# Patient Record
Sex: FEMALE | Race: WHITE | NOT HISPANIC OR LATINO | Employment: OTHER | URBAN - METROPOLITAN AREA
[De-identification: names, ages, dates, MRNs, and addresses within clinical notes are randomized per-mention and may not be internally consistent; named-entity substitution may affect disease eponyms.]

---

## 2017-03-31 ENCOUNTER — TELEPHONE (OUTPATIENT)
Dept: CARDIOLOGY | Facility: MEDICAL CENTER | Age: 72
End: 2017-03-31

## 2017-03-31 NOTE — TELEPHONE ENCOUNTER
Discussed safe medications for pt to take and which to avoid. Pt was advised to avoid decongestants and anything with pseudoephedrine as it may exacerbate her a-fib.  Also advised pt to s/w her PCP about nasal sprays that may also be helpful. Pt understands and appreciates.

## 2017-03-31 NOTE — TELEPHONE ENCOUNTER
----- Message from Josee Akhtar sent at 3/31/2017  8:59 AM PDT -----  Regarding: patient has question about OTC for cold  KAYLIN/Alfredo      Patient on Xarelto and wants to know about taking OTC decongestant for her cold. She can be reached at [phone].

## 2017-04-25 DIAGNOSIS — I48.0 PAF (PAROXYSMAL ATRIAL FIBRILLATION) (HCC): ICD-10-CM

## 2017-05-02 ENCOUNTER — NON-PROVIDER VISIT (OUTPATIENT)
Dept: CARDIOLOGY | Facility: MEDICAL CENTER | Age: 72
End: 2017-05-02
Payer: MEDICARE

## 2017-05-02 ENCOUNTER — HOSPITAL ENCOUNTER (OUTPATIENT)
Dept: CARDIOLOGY | Facility: MEDICAL CENTER | Age: 72
End: 2017-05-02
Attending: FAMILY MEDICINE | Admitting: FAMILY MEDICINE
Payer: MEDICARE

## 2017-05-02 DIAGNOSIS — I48.0 PAROXYSMAL ATRIAL FIBRILLATION (HCC): ICD-10-CM

## 2017-05-02 DIAGNOSIS — I71.21 ANEURYSM OF AORTIC ROOT (HCC): Chronic | ICD-10-CM

## 2017-05-02 DIAGNOSIS — I49.5 SICK SINUS SYNDROME (HCC): ICD-10-CM

## 2017-05-02 DIAGNOSIS — Z95.0 CARDIAC PACEMAKER IN SITU: ICD-10-CM

## 2017-05-02 LAB
LV EJECT FRACT  99904: 65
LV EJECT FRACT MOD 2C 99903: 64.57
LV EJECT FRACT MOD 4C 99902: 70.85
LV EJECT FRACT MOD BP 99901: 70.09

## 2017-05-02 PROCEDURE — 93306 TTE W/DOPPLER COMPLETE: CPT | Mod: 26 | Performed by: INTERNAL MEDICINE

## 2017-05-02 PROCEDURE — 93306 TTE W/DOPPLER COMPLETE: CPT

## 2017-05-02 PROCEDURE — 93280 PM DEVICE PROGR EVAL DUAL: CPT | Performed by: INTERNAL MEDICINE

## 2017-05-03 ENCOUNTER — TELEPHONE (OUTPATIENT)
Dept: CARDIOLOGY | Facility: MEDICAL CENTER | Age: 72
End: 2017-05-03

## 2017-05-03 NOTE — TELEPHONE ENCOUNTER
----- Message -----      From: Ronni Llanes M.D.      Sent: 5/2/2017   2:55 PM        To: Beatriz Ceja R.N.     Please call with unremarkable study, echocardiogram showing mild ascending aortic aneurysm. No change from last year. We will repeat an echocardiogram in one year.     Thanks.  KAYLIN   --------  Pt notified of Echo results. Pt to FU with Dr. Llanes 5/11

## 2017-05-11 ENCOUNTER — OFFICE VISIT (OUTPATIENT)
Dept: CARDIOLOGY | Facility: MEDICAL CENTER | Age: 72
End: 2017-05-11
Payer: MEDICARE

## 2017-05-11 VITALS
SYSTOLIC BLOOD PRESSURE: 148 MMHG | DIASTOLIC BLOOD PRESSURE: 92 MMHG | HEIGHT: 66 IN | BODY MASS INDEX: 32.14 KG/M2 | WEIGHT: 200 LBS | OXYGEN SATURATION: 93 % | HEART RATE: 60 BPM

## 2017-05-11 DIAGNOSIS — I10 BENIGN ESSENTIAL HYPERTENSION: Chronic | ICD-10-CM

## 2017-05-11 DIAGNOSIS — Z95.0 CARDIAC PACEMAKER IN SITU: ICD-10-CM

## 2017-05-11 DIAGNOSIS — E78.5 DYSLIPIDEMIA: ICD-10-CM

## 2017-05-11 DIAGNOSIS — I49.5 SSS (SICK SINUS SYNDROME) (HCC): Chronic | ICD-10-CM

## 2017-05-11 DIAGNOSIS — I48.0 PAROXYSMAL ATRIAL FIBRILLATION (HCC): Chronic | ICD-10-CM

## 2017-05-11 DIAGNOSIS — I48.92 ATRIAL FLUTTER, UNSPECIFIED TYPE (HCC): Chronic | ICD-10-CM

## 2017-05-11 DIAGNOSIS — I71.21 ANEURYSM OF AORTIC ROOT (HCC): Chronic | ICD-10-CM

## 2017-05-11 DIAGNOSIS — Z79.01 CHRONIC ANTICOAGULATION: ICD-10-CM

## 2017-05-11 PROCEDURE — 3017F COLORECTAL CA SCREEN DOC REV: CPT | Mod: 8P | Performed by: INTERNAL MEDICINE

## 2017-05-11 PROCEDURE — 1036F TOBACCO NON-USER: CPT | Performed by: INTERNAL MEDICINE

## 2017-05-11 PROCEDURE — G8419 CALC BMI OUT NRM PARAM NOF/U: HCPCS | Performed by: INTERNAL MEDICINE

## 2017-05-11 PROCEDURE — 1101F PT FALLS ASSESS-DOCD LE1/YR: CPT | Mod: 8P | Performed by: INTERNAL MEDICINE

## 2017-05-11 PROCEDURE — 4040F PNEUMOC VAC/ADMIN/RCVD: CPT | Performed by: INTERNAL MEDICINE

## 2017-05-11 PROCEDURE — 99214 OFFICE O/P EST MOD 30 MIN: CPT | Performed by: INTERNAL MEDICINE

## 2017-05-11 PROCEDURE — 3014F SCREEN MAMMO DOC REV: CPT | Mod: 8P | Performed by: INTERNAL MEDICINE

## 2017-05-11 PROCEDURE — G8598 ASA/ANTIPLAT THER USED: HCPCS | Performed by: INTERNAL MEDICINE

## 2017-05-11 PROCEDURE — G8432 DEP SCR NOT DOC, RNG: HCPCS | Performed by: INTERNAL MEDICINE

## 2017-05-11 RX ORDER — TRAZODONE HYDROCHLORIDE 50 MG/1
100 TABLET ORAL NIGHTLY
COMMUNITY
End: 2017-09-01

## 2017-05-11 ASSESSMENT — ENCOUNTER SYMPTOMS
ABDOMINAL PAIN: 0
NERVOUS/ANXIOUS: 0
DEPRESSION: 1
DIZZINESS: 0
LOSS OF CONSCIOUSNESS: 0
BLOOD IN STOOL: 0
SHORTNESS OF BREATH: 0
PALPITATIONS: 0
MYALGIAS: 0
WEIGHT LOSS: 0
FLANK PAIN: 0
CHILLS: 0
ORTHOPNEA: 0
FEVER: 0
PND: 0
INSOMNIA: 1
BLURRED VISION: 0

## 2017-05-11 NOTE — MR AVS SNAPSHOT
"        Jayna Connorsepifanio   2017 10:20 AM   Office Visit   MRN: [id number]    Department:  [facility]   Dept Phone:  [phone]    Description:  Female : 1945   Provider:  Ronni Llanes M.D.           Reason for Visit     Follow-Up           Allergies as of 2017     Allergen Noted Reactions    Levaquin 10/08/2014   Hives, Swelling    Macrobid [Nitrofurantoin Macrocrystal] 10/08/2014   Hives, Swelling    Sotalol 2014       \"dizzy\"      You were diagnosed with     Paroxysmal atrial fibrillation (CMS-[facility])   [850283]       Atrial flutter, unspecified type (CMS-[facility])   [1076750]       SSS (sick sinus syndrome) (CMS-[facility])   [901054]       Chronic anticoagulation   [354040]       Cardiac pacemaker in situ   [V45.01.ICD-9-CM]       Benign essential hypertension   [605388]       Dyslipidemia   [430567]       Aneurysm of aortic root (CMS-[facility])   [530969]         Vital Signs     Blood Pressure Pulse Height Weight Body Mass Index Oxygen Saturation    148/92 mmHg 60 1.676 m (5' 5.98\") 90.719 kg (200 lb) 32.30 kg/m2 93%    Smoking Status                   Never Smoker            Basic Information     Date Of Birth Sex Race Ethnicity Preferred Language    1945 Female White Non- English      Your appointments     May 23, 2017  9:30 AM   NM CARDIAC STRESS TEST (30) with [facility]   [facility]    [address]  [place] [id number] [phone]           NPO for 4 hours prior to scan.  No caffeine for 24 hours prior to test (decaf, coffee, cola, tea, chocolate, Excedrin, and Anacin).  No Viagra or other ED meds for 48 hours.  Warn patient of length of test and to bring list of meds.            2017  3:00 PM   PACER CHECK ONLY with PACER [facility]   [facility] (--)    [address]  [place] [address] [phone]            2018  2:15 PM   ECHO with ECHO [facility]   "   ECHOCARDIOLOGY [facility] ([facility])    [address]  [place] [address]   [phone]           No prep              Problem List              ICD-10-CM Priority Class Noted - Resolved    Aneurysm of aortic root (CMS-HCC) (Chronic) I71.9   9/27/2011 - Present    Atrial flutter (CMS-HCC) (Chronic) I48.92 Medium  9/27/2011 - Present    Benign essential hypertension (Chronic) I10 Low  9/27/2011 - Present    Carotid artery stenosis (Chronic) I65.29   9/27/2011 - Present    Long term current use of anticoagulant therapy (Chronic) Z79.01 Medium  8/4/2011 - Present    Dizziness (Chronic) R42 High  9/27/2011 - Present    History of echocardiogram (Chronic) Z92.89   9/27/2011 - Present    History of myocardial perfusion scan (Chronic) Z92.89   9/27/2011 - Present    Presence of permanent cardiac pacemaker (Chronic) Z95.0 Medium  9/27/2011 - Present    Paroxysmal atrial fibrillation (CMS-HCC) (Chronic) I48.0 Medium  9/27/2011 - Present    Pulmonary embolism (CMS-HCC) (Chronic) I26.99   8/4/2011 - Present    SSS (sick sinus syndrome) (CMS-HCC) (Chronic) I49.5 Medium  7/22/2011 - Present    Sleep apnea (Chronic) G47.30   9/27/2011 - Present    Encounter for long-term (current) use of other medications (Chronic) Z79.899   7/22/2011 - Present    TIA (transient ischemic attack) (Chronic) G45.9   9/27/2011 - Present    History of depression (Chronic) Z86.59   9/27/2011 - Present    Rectocele, female N81.6   2/4/2014 - Present    Stress incontinence in female N39.3   2/4/2014 - Present    Rectal pain K62.89   2/7/2014 - Present    Carotid stenosis I65.29   5/9/2014 - Present    Chronic anticoagulation Z79.01   5/11/2017 - Present    Cardiac pacemaker in situ Z95.0   5/11/2017 - Present    Dyslipidemia E78.5   5/11/2017 - Present      Health Maintenance        Date Due Completion Dates    IMM DTaP/Tdap/Td Vaccine (1 - Tdap) 8/23/1962 ---    PAP SMEAR 8/23/1964 ---    MAMMOGRAM 8/23/1983 ---    COLONOSCOPY 8/23/1993 ---    BONE  DENSITY 8/23/2008 ---    IMM PNEUMOCOCCAL 65+ (ADULT) LOW/MEDIUM RISK SERIES (2 of 2 - PCV13) 10/1/2010 10/1/2009            Current Immunizations     Influenza TIV (IM) 10/1/2013    Pneumococcal polysaccharide vaccine (PPSV-23) 10/1/2009    SHINGLES VACCINE 10/1/2013      Below and/or attached are the medications your provider expects you to take. Review all of your home medications and newly ordered medications with your provider and/or pharmacist. Follow medication instructions as directed by your provider and/or pharmacist. Please keep your medication list with you and share with your provider. Update the information when medications are discontinued, doses are changed, or new medications (including over-the-counter products) are added; and carry medication information at all times in the event of emergency situations     Allergies:  LEVAQUIN - Hives,Swelling     MACROBID - Hives,Swelling     SOTALOL - (reactions not documented)               Medications  Valid as of: May 11, 2017 - 11:18 AM    Generic Name Brand Name Tablet Size Instructions for use    Ascorbic Acid (Cap) Vitamin C 500 MG Take 500 mg by mouth every day.        Biotin (Cap) Biotin 5000 MCG Take  by mouth.        Calcium Carbonate-Vit D-Min   Take 1 Tab by mouth every day.        Cyanocobalamin   Take  by mouth.        DiphenhydrAMINE HCl (Tab) BENADRYL 25 MG Take 25 mg by mouth at bedtime as needed.        Estrogens, Conjugated (Cream) PREMARIN 0.625 MG/GM Insert 0.5 g in vagina every 3 days.        Ezetimibe (Tab) ZETIA 10 MG TAKE 0.5 TABLET BY MOUTH EVERY DAY        Glucos-MSM-C-Wg-Qenjhu-Ymlmxc (Tab) GLUCOSAMINE MSM COMPLEX  Take 2 Tabs by mouth every day. 1200 mg        Melatonin (Tab) Melatonin 5 MG Take  by mouth.        Methenamine Hippurate (Tab) HIPPREX 1 GM Take 1 g by mouth every day.        Metoprolol Tartrate (Tab) LOPRESSOR 25 MG TAKE 1 TABLET BY MOUTH TWICE DAILY        Misc Natural Products   Take  by mouth every day.         Multiple Vitamins-Minerals   Take 1 Tab by mouth every day.        Omega-3 Fatty Acids (Cap) Omega-3 Fatty Acids 1200 MG Take 1,000 mg by mouth every day.        Probiotic Product   Take  by mouth.        Rivaroxaban (Tab) XARELTO 20 MG Take 1 Tab by mouth with dinner.        TraZODone HCl (Tab) DESYREL 50 MG Take 50 mg by mouth every evening.        .                 Medicines prescribed today were sent to:     [id number] - [place], [place] - [address] AT [facility]    [address] [place] [address]    Phone: [phone] Fax: [phone]    Open 24 Hours?: No    [id number] - [place], [place] - [address] AT [facility] [address]    [address] [place] [address]    Phone: [phone] Fax: [phone]    Open 24 Hours?: No      Medication refill instructions:       If your prescription bottle indicates you have medication refills left, it is not necessary to call your provider’s office. Please contact your pharmacy and they will refill your medication.    If your prescription bottle indicates you do not have any refills left, you may request refills at any time through one of the following ways: The online [employer] system (except Urgent Care), by calling your provider’s office, or by asking your pharmacy to contact your provider’s office with a refill request. Medication refills are processed only during regular business hours and may not be available until the next business day. Your provider may request additional information or to have a follow-up visit with you prior to refilling your medication.   *Please Note: Medication refills are assigned a new Rx number when refilled electronically. Your pharmacy may indicate that no refills were authorized even though a new prescription for the same medication is available at the pharmacy. Please request the medicine by name with the pharmacy before contacting your provider for a refill.        Your  To Do List     Future Labs/Procedures Complete By Expires    ECHOCARDIOGRAM COMP W/O CONT  As directed 5/12/2018    NM-CARDIAC STRESS TEST  As directed 11/11/2017    Scheduling Instructions:    Atrial fibrillation.         [employer] Access Code: Activation code not generated  Current [employer] Status: Active

## 2017-05-11 NOTE — PROGRESS NOTES
"Subjective:   Jayna Lewis is a 71 y.o. female who presents for annual follow up of ascending aortic aneurysm and study result review.    HPI    Since the patient's last visit on 05/10/16, she has been doing well clinically from cardiac standpoint. She denies chest pain, shortness of breath, palpitations, nausea/vomiting or diaphoresis. She has been suffering with insomnia and depression and has been started on medication.    Review of Systems   Constitutional: Negative for fever, chills, weight loss and malaise/fatigue.        Weight gain.   HENT: Negative for congestion.    Eyes: Negative for blurred vision.   Respiratory: Negative for shortness of breath.    Cardiovascular: Negative for chest pain, palpitations, orthopnea, leg swelling and PND.   Gastrointestinal: Negative for abdominal pain, blood in stool and melena.   Genitourinary: Negative for dysuria, urgency, frequency, hematuria and flank pain.   Musculoskeletal: Negative for myalgias and joint pain.   Skin: Negative for rash.   Neurological: Negative for dizziness and loss of consciousness.   Psychiatric/Behavioral: Positive for depression. The patient has insomnia. The patient is not nervous/anxious.         Objective:     /92 mmHg  Pulse 60  Ht 1.676 m (5' 5.98\")  Wt 90.719 kg (200 lb)  BMI 32.30 kg/m2  SpO2 93%    Physical Exam   Constitutional: She is oriented to person, place, and time. She appears well-developed and well-nourished.   HENT:   Head: Normocephalic and atraumatic.   Eyes: Conjunctivae are normal. Pupils are equal, round, and reactive to light.   Neck: Normal range of motion. Neck supple.   Cardiovascular: Normal rate and regular rhythm.    Pulmonary/Chest: Effort normal and breath sounds normal.   Abdominal: Soft. Bowel sounds are normal. She exhibits no distension and no mass. There is no tenderness. There is no rebound and no guarding.   Musculoskeletal: Normal range of motion. She exhibits no edema.   Neurological: " She is alert and oriented to person, place, and time.   Skin: Skin is warm and dry.   Psychiatric: She has a normal mood and affect.     Medications reviewed.    Current Outpatient Prescriptions   Medication   • Cyanocobalamin (VITAMIN B 12 PO)   • Probiotic Product (PROBIOTIC DAILY PO)   • trazodone (DESYREL) 50 MG Tab   • rivaroxaban (XARELTO) 20 MG Tab tablet   • metoprolol (LOPRESSOR) 25 MG Tab   • ZETIA 10 MG Tab   • Biotin 5000 MCG Cap   • methenamine hip (HIPPREX) 1 GM TABS   • Ascorbic Acid (VITAMIN C) 500 MG CAPS   • Omega-3 Fatty Acids (FISH OIL) 1200 MG CAPS   • Calcium Carbonate-Vit D-Min (CALCIUM 1200 PO)   • conjugated estrogen (PREMARIN) 0.625 MG/GM CREA   • Multiple Vitamin (MULTIVITAMIN PO)   • Glucos-MSM-C-Zs-Idhswl-Yakzeq (GLUCOSAMINE MSM COMPLEX) TABS   • Melatonin 5 MG Tab   • Misc Natural Products (DEEP SLEEP PO)   • diphenhydrAMINE (BENADRYL) 25 MG TABS     No current facility-administered medications for this visit.     CARDIAC STUDIES/PROCEDURES:    CAROTID ULTRASOUND (05/14/14)  Very mild plaque seen in carotid artery bilaterally.  Normal vertebral flow.    CAROTID ULTRASOUND (07/14/09)  Carotid ultrasound showing mild stenosis.    CT OF CHEST (04/15/15)  1. Some fusiform dilatation of the ascending thoracic aorta is noted with maximum axial dimensions of 4.7 x 4.2 cm. There is no evidence of saccular aneurysm and there is no evidence of dissection.  2. No significant atherosclerotic plaque either calcified or noncalcified is visible in the aorta.  3. Main branch vessels of the aorta in the chest and abdomen appear patent.  4. Cholelithiasis.  5. Small area of arterioportal shunting appears to be present laterally in the right lobe of the liver.  6. Linear opacifications in each lung base are likely due to discoid atelectasis or scarring.    ECHOCARDIOGRAM CONCLUSIONS (05/02/17)  Prior echocardiogram 4/25/2016. Compared to the report of the study   done - there has been no significant  change.   Normal left ventricular systolic function.  Left ventricular ejection fraction is visually estimated to be 65%.  Grade I diastolic dysfunction.  Pacer/ICD wire seen in right ventricle.  Mild mitral regurgitation.  Mild tricuspid regurgitation.  Estimated right ventricular systolic pressure is 25 mmHg.  Ascending aorta is dilated with a diameter of 4.4 cm.    ECHOCARDIOGRAM CONCLUSIONS (04/25/16)  Compared to the report of the study done 09/14/15- there has been no   significant change.   Normal left ventricular systolic function.  Left ventricular ejection fraction is visually estimated to be 65%.  Grade I diastolic dysfunction.  Pacer/ICD wire seen in right ventricle.  Mild mitral regurgitation.  Mild tricuspid regurgitation.  Right ventricular systolic pressure is estimated to be 25 mmHg.  Mild pulmonic insufficiency.  Ascending aorta is dilated with a diameter of 4.3 cm.    ECHOCARDIOGRAM CONCLUSIONS (09/14/15)  Compared to prior study of 01/07/2015; no significant changes were noted.  Normal left ventricular systolic function.  Left ventricular ejection fraction is visually estimated to be 65%.  Grade I diastolic dysfunction.   Pacer/ICD wire seen in right ventricle.  Mild tricuspid regurgitation.  Right ventricular systolic pressure is estimated to be 30 mmHg.  Mild to moderate pulmonic insufficiency.  Ascending aorta dilatation, 4.3 cm.    ECHOCARDIOGRAM CONCLUSIONS (01/07/15)  Normal left ventricular systolic function.  Mild concentric left ventricular hypertrophy.  Grade II diastolic dysfunction - pseudonormal mitral inflow is observed.  Pacer wire seen in right ventricle.  Catheter/pacemaker wire present in the right atrial cavity.  Mildly dilated left atrium.  Mitral annular calcification.  Mild mitral regurgitation.  Aortic sclerosis without stenosis.  Mild tricuspid regurgitation.  Trace pulmonic insufficiency.  Aortic root, 3.5 cm. Dilated ascending aorta, 4.5 cm.    ECHOCARDIOGRAM CONCLUSIONS  (06/25/13)  Normal left ventricular systolic function.   Left ventricular ejection fraction is 65% to 70%.   Mild mitral regurgitation.  Moderate pulmonic insufficiency.  Ascending aorta measuring 3.5 cm.    ECHOCARDIOGRAM CONCLUSIONS (07/27/11)  Echocardiogram showing ejection fraction of 70% with mild tricuspid regurgitation and mild aortic root dilation.    EKG performed on (02/20/14) EKG shows sinus tachycardia with diffuse ST segment depressions.  EKG performed on (02/08/14) EKG shows atrial fibrillation.    Laboratory results of (03/28/17) were reviewed. Cholesterol profile of 179/103/63/95 noted.  Laboratory results of (05/09/16) Cholesterol profile of 150/97/60/71 noted.  Laboratory results of (10/30/15) Cholesterol profile of 195/92/62/115 noted.  Laboratory results of (01/07/15) Cholesterol profile of 182/62/66/104 noted.  Laboratory results of (01/03/12) Protein C and S levels were low.    MPI CONCLUSION (10/06/06)  Unremarkable MPI.     Assessment:     Patient Active Problem List   Diagnoses Date Noted   • Atrial flutter [427.32] Paroxysmal atrial fibrillation [427.31K] 09/27/2011     Priority: Medium   • SSS (sick sinus syndrome) [427.81AE] 09/27/2011     Priority: Medium   • Presence of permanent cardiac pacemaker [V45.01AR] 09/27/2011     Priority: Medium   • Encounter for long-term (current) use of anticoagulants [V58.61] 08/04/2011     Priority: Medium   • Hypertension [401.1B] 09/27/2011     Priority: Low   • Hyperlipidemia [272.4AY] 09/27/2011     Priority: Low   • Aneurysm of aortic root [441.9AZ] 09/27/2011   • Carotid artery stenosis [433.10A] 09/27/2011   • TIA (transient ischemic attack) [435.9E] 09/27/2011   • Pulmonary embolism [415.19AD] 08/04/2011     Plan:     1. Atrial fibrillation/flutter/sick sinus syndrome with pacemaker placement on anticoagulation therapy (Xarelto) and intolerance sotalol. She is clinically doing well. We will repeat a myocardial perfusion imaging study.  2.  Hypertension: Blood pressure is high today, however, usually well controlled.  3. Hyperlipidemia with intolerance to statin therapy: Stable on [place] 3 FA and Zetia therapy. We will repeat labs including fasting lipid profile in one year.  4. Aortic root dilation: Clinically stable on medical therapy. We will repeat an echocardiogram in one year.  5. Carotid artery stenosis: Mild disease which is stable on medical therapy.   6. History of trans-ischemic attack: She remains clinically stable without any new neurological symptoms.  7. History of pulmonary embolism: She is clinically doing well without recurrence or chest pain or shortness of breath.  8. Health maintenance: Vaginal hematoma, status post rectocele repair and mid-urethral sling procedure managed by Dr. Eden: Recovered.  9. Additional information: She is under stress due lost of contact with all of her children.    We will follow up the patient in one year with echocardiogram and myocardial perfusion imaging study.    CC Vu Retana

## 2017-05-11 NOTE — Clinical Note
"     [facility]   [address], [address]  [place] [address]  Phone: [phone]  Fax: [phone]              Jayna Lewis  1945    Encounter Date: 5/11/2017    Ronni Llanes M.D.          PROGRESS NOTE:  Subjective:   Jayna Lewis is a 71 y.o. female who presents for annual follow up of ascending aortic aneurysm and study result review.    HPI    Since the patient's last visit on 05/10/16, she has been doing well clinically from cardiac standpoint. She denies chest pain, shortness of breath, palpitations, nausea/vomiting or diaphoresis. She has been suffering with insomnia and depression and has been started on medication.    Review of Systems   Constitutional: Negative for fever, chills, weight loss and malaise/fatigue.        Weight gain.   HENT: Negative for congestion.    Eyes: Negative for blurred vision.   Respiratory: Negative for shortness of breath.    Cardiovascular: Negative for chest pain, palpitations, orthopnea, leg swelling and PND.   Gastrointestinal: Negative for abdominal pain, blood in stool and melena.   Genitourinary: Negative for dysuria, urgency, frequency, hematuria and flank pain.   Musculoskeletal: Negative for myalgias and joint pain.   Skin: Negative for rash.   Neurological: Negative for dizziness and loss of consciousness.   Psychiatric/Behavioral: Positive for depression. The patient has insomnia. The patient is not nervous/anxious.         Objective:     /92 mmHg  Pulse 60  Ht 1.676 m (5' 5.98\")  Wt 90.719 kg (200 lb)  BMI 32.30 kg/m2  SpO2 93%    Physical Exam   Constitutional: She is oriented to person, place, and time. She appears well-developed and well-nourished.   HENT:   Head: Normocephalic and atraumatic.   Eyes: Conjunctivae are normal. Pupils are equal, round, and reactive to light.   Neck: Normal range of motion. Neck supple.   Cardiovascular: Normal rate and regular rhythm.    Pulmonary/Chest: Effort " normal and breath sounds normal.   Abdominal: Soft. Bowel sounds are normal. She exhibits no distension and no mass. There is no tenderness. There is no rebound and no guarding.   Musculoskeletal: Normal range of motion. She exhibits no edema.   Neurological: She is alert and oriented to person, place, and time.   Skin: Skin is warm and dry.   Psychiatric: She has a normal mood and affect.     Medications reviewed.    Current Outpatient Prescriptions   Medication   • Cyanocobalamin (VITAMIN B 12 PO)   • Probiotic Product (PROBIOTIC DAILY PO)   • trazodone (DESYREL) 50 MG Tab   • rivaroxaban (XARELTO) 20 MG Tab tablet   • metoprolol (LOPRESSOR) 25 MG Tab   • ZETIA 10 MG Tab   • Biotin 5000 MCG Cap   • methenamine hip (HIPPREX) 1 GM TABS   • Ascorbic Acid (VITAMIN C) 500 MG CAPS   • Omega-3 Fatty Acids (FISH OIL) 1200 MG CAPS   • Calcium Carbonate-Vit D-Min (CALCIUM 1200 PO)   • conjugated estrogen (PREMARIN) 0.625 MG/GM CREA   • Multiple Vitamin (MULTIVITAMIN PO)   • Glucos-MSM-C-Xq-Jciaoy-Ccinpz (GLUCOSAMINE MSM COMPLEX) TABS   • Melatonin 5 MG Tab   • Misc Natural Products (DEEP SLEEP PO)   • diphenhydrAMINE (BENADRYL) 25 MG TABS     No current facility-administered medications for this visit.     CARDIAC STUDIES/PROCEDURES:    CAROTID ULTRASOUND (05/14/14)  Very mild plaque seen in carotid artery bilaterally.  Normal vertebral flow.    CAROTID ULTRASOUND (07/14/09)  Carotid ultrasound showing mild stenosis.    CT OF CHEST (04/15/15)  1. Some fusiform dilatation of the ascending thoracic aorta is noted with maximum axial dimensions of 4.7 x 4.2 cm. There is no evidence of saccular aneurysm and there is no evidence of dissection.  2. No significant atherosclerotic plaque either calcified or noncalcified is visible in the aorta.  3. Main branch vessels of the aorta in the chest and abdomen appear patent.  4. Cholelithiasis.  5. Small area of arterioportal shunting appears to be present laterally in the right lobe of  the liver.  6. Linear opacifications in each lung base are likely due to discoid atelectasis or scarring.    ECHOCARDIOGRAM CONCLUSIONS (05/02/17)  Prior echocardiogram 4/25/2016. Compared to the report of the study   done - there has been no significant change.   Normal left ventricular systolic function.  Left ventricular ejection fraction is visually estimated to be 65%.  Grade I diastolic dysfunction.  Pacer/ICD wire seen in right ventricle.  Mild mitral regurgitation.  Mild tricuspid regurgitation.  Estimated right ventricular systolic pressure is 25 mmHg.  Ascending aorta is dilated with a diameter of 4.4 cm.    ECHOCARDIOGRAM CONCLUSIONS (04/25/16)  Compared to the report of the study done 09/14/15- there has been no   significant change.   Normal left ventricular systolic function.  Left ventricular ejection fraction is visually estimated to be 65%.  Grade I diastolic dysfunction.  Pacer/ICD wire seen in right ventricle.  Mild mitral regurgitation.  Mild tricuspid regurgitation.  Right ventricular systolic pressure is estimated to be 25 mmHg.  Mild pulmonic insufficiency.  Ascending aorta is dilated with a diameter of 4.3 cm.    ECHOCARDIOGRAM CONCLUSIONS (09/14/15)  Compared to prior study of 01/07/2015; no significant changes were noted.  Normal left ventricular systolic function.  Left ventricular ejection fraction is visually estimated to be 65%.  Grade I diastolic dysfunction.   Pacer/ICD wire seen in right ventricle.  Mild tricuspid regurgitation.  Right ventricular systolic pressure is estimated to be 30 mmHg.  Mild to moderate pulmonic insufficiency.  Ascending aorta dilatation, 4.3 cm.    ECHOCARDIOGRAM CONCLUSIONS (01/07/15)  Normal left ventricular systolic function.  Mild concentric left ventricular hypertrophy.  Grade II diastolic dysfunction - pseudonormal mitral inflow is observed.  Pacer wire seen in right ventricle.  Catheter/pacemaker wire present in the right atrial cavity.  Mildly dilated  left atrium.  Mitral annular calcification.  Mild mitral regurgitation.  Aortic sclerosis without stenosis.  Mild tricuspid regurgitation.  Trace pulmonic insufficiency.  Aortic root, 3.5 cm. Dilated ascending aorta, 4.5 cm.    ECHOCARDIOGRAM CONCLUSIONS (06/25/13)  Normal left ventricular systolic function.   Left ventricular ejection fraction is 65% to 70%.   Mild mitral regurgitation.  Moderate pulmonic insufficiency.  Ascending aorta measuring 3.5 cm.    ECHOCARDIOGRAM CONCLUSIONS (07/27/11)  Echocardiogram showing ejection fraction of 70% with mild tricuspid regurgitation and mild aortic root dilation.    EKG performed on (02/20/14) EKG shows sinus tachycardia with diffuse ST segment depressions.  EKG performed on (02/08/14) EKG shows atrial fibrillation.    Laboratory results of (03/28/17) were reviewed. Cholesterol profile of 179/103/63/95 noted.  Laboratory results of (05/09/16) Cholesterol profile of 150/97/60/71 noted.  Laboratory results of (10/30/15) Cholesterol profile of 195/92/62/115 noted.  Laboratory results of (01/07/15) Cholesterol profile of 182/62/66/104 noted.  Laboratory results of (01/03/12) Protein C and S levels were low.    MPI CONCLUSION (10/06/06)  Unremarkable MPI.     Assessment:     Patient Active Problem List   Diagnoses Date Noted   • Atrial flutter [427.32] Paroxysmal atrial fibrillation [427.31K] 09/27/2011     Priority: Medium   • SSS (sick sinus syndrome) [427.81AE] 09/27/2011     Priority: Medium   • Presence of permanent cardiac pacemaker [V45.01AR] 09/27/2011     Priority: Medium   • Encounter for long-term (current) use of anticoagulants [V58.61] 08/04/2011     Priority: Medium   • Hypertension [401.1B] 09/27/2011     Priority: Low   • Hyperlipidemia [272.4AY] 09/27/2011     Priority: Low   • Aneurysm of aortic root [441.9AZ] 09/27/2011   • Carotid artery stenosis [433.10A] 09/27/2011   • TIA (transient ischemic attack) [435.9E] 09/27/2011   • Pulmonary embolism [415.19AD]  08/04/2011     Plan:     1. Atrial fibrillation/flutter/sick sinus syndrome with pacemaker placement on anticoagulation therapy (Xarelto) and intolerance sotalol. She is clinically doing well. We will repeat a myocardial perfusion imaging study.  2. Hypertension: Blood pressure is high today, however, usually well controlled.  3. Hyperlipidemia with intolerance to statin therapy: Stable on [place] 3 FA and Zetia therapy. We will repeat labs including fasting lipid profile in one year.  4. Aortic root dilation: Clinically stable on medical therapy. We will repeat an echocardiogram in one year.  5. Carotid artery stenosis: Mild disease which is stable on medical therapy.   6. History of trans-ischemic attack: She remains clinically stable without any new neurological symptoms.  7. History of pulmonary embolism: She is clinically doing well without recurrence or chest pain or shortness of breath.  8. Health maintenance: Vaginal hematoma, status post rectocele repair and mid-urethral sling procedure managed by Dr. Eden: Recovered.  9. Additional information: She is under stress due lost of contact with all of her children.    We will follow up the patient in one year with echocardiogram and myocardial perfusion imaging study.    CC Vu Retana

## 2017-05-23 ENCOUNTER — HOSPITAL ENCOUNTER (OUTPATIENT)
Dept: RADIOLOGY | Facility: MEDICAL CENTER | Age: 72
End: 2017-05-23
Attending: INTERNAL MEDICINE
Payer: MEDICARE

## 2017-05-23 DIAGNOSIS — I48.0 PAROXYSMAL ATRIAL FIBRILLATION (HCC): Chronic | ICD-10-CM

## 2017-05-23 DIAGNOSIS — I48.92 ATRIAL FLUTTER, UNSPECIFIED TYPE (HCC): Chronic | ICD-10-CM

## 2017-05-23 PROCEDURE — A9502 TC99M TETROFOSMIN: HCPCS

## 2017-05-23 PROCEDURE — 700111 HCHG RX REV CODE 636 W/ 250 OVERRIDE (IP)

## 2017-05-23 RX ORDER — REGADENOSON 0.08 MG/ML
INJECTION, SOLUTION INTRAVENOUS
Status: COMPLETED
Start: 2017-05-23 | End: 2017-05-23

## 2017-05-23 RX ADMIN — REGADENOSON 0.4 MG: 0.08 INJECTION, SOLUTION INTRAVENOUS at 10:42

## 2017-05-23 NOTE — PROGRESS NOTES
Nursing care plan includes knowledge deficit, potential for discomfort, potential for compromised cardiac output. POC includes teaching, comfort measures and reassurance, and access to code cart, cardiology stand by and availability of rapid response team. Pt verbalizes good understanding of benefits and risks of pharmacological cardiac stress test. Informed consent obtained. Lexiscan given, pt developed the following symptoms palpitations and slight SOB. VS stable, major symptoms resolved. To waiting room, caffeinated fluids and/or snacks given, awaiting second scan. Nursing goals met.

## 2017-05-25 ENCOUNTER — HOSPITAL ENCOUNTER (EMERGENCY)
Facility: MEDICAL CENTER | Age: 72
End: 2017-05-25
Attending: EMERGENCY MEDICINE
Payer: MEDICARE

## 2017-05-25 VITALS
HEART RATE: 56 BPM | RESPIRATION RATE: 16 BRPM | OXYGEN SATURATION: 98 % | SYSTOLIC BLOOD PRESSURE: 128 MMHG | WEIGHT: 198.19 LBS | BODY MASS INDEX: 31.85 KG/M2 | HEIGHT: 66 IN | DIASTOLIC BLOOD PRESSURE: 81 MMHG | TEMPERATURE: 98.2 F

## 2017-05-25 DIAGNOSIS — H00.012 HORDEOLUM EXTERNUM OF RIGHT LOWER EYELID: ICD-10-CM

## 2017-05-25 PROCEDURE — 99283 EMERGENCY DEPT VISIT LOW MDM: CPT

## 2017-05-25 RX ORDER — BACITRACIN 500 [USP'U]/G
0.5 OINTMENT OPHTHALMIC 3 TIMES DAILY
Qty: 1 TUBE | Refills: 0 | Status: SHIPPED | OUTPATIENT
Start: 2017-05-25 | End: 2017-09-01

## 2017-05-25 ASSESSMENT — PAIN SCALES - GENERAL: PAINLEVEL_OUTOF10: 2

## 2017-05-25 NOTE — ED NOTES
Co right eye pain with a little drainage in the am for 3 days. She feels that her eyes are sunken, small lower lid redness present. Had a super bug recently and she states since then she get odd infections is odd places.

## 2017-05-25 NOTE — ED AVS SNAPSHOT
Home Care Instructions                                                                                                                Jayna Lewis   MRN: [id number]    Department:  [facility], Emergency Dept   Date of Visit:  5/25/2017            [facility], Emergency Dept    [address] [place] [address]    Phone:  [phone]      You were seen by     Dre Allred M.D.      Your Diagnosis Was     Hordeolum externum of right lower eyelid     H00.012       Follow-up Information     1. Follow up with Your Eye Doctor. Schedule an appointment as soon as possible for a visit in 1 week.    Why:  For re-check      Medication Information     Review all of your home medications and newly ordered medications with your primary doctor and/or pharmacist as soon as possible. Follow medication instructions as directed by your doctor and/or pharmacist.     Please keep your complete medication list with you and share with your physician. Update the information when medications are discontinued, doses are changed, or new medications (including over-the-counter products) are added; and carry medication information at all times in the event of emergency situations.               Medication List      START taking these medications        Instructions    Morning Afternoon Evening Bedtime    bacitracin 500 UNIT/GM Oint        Place 0.5 Inches in right eye 3 times a day.   Dose:  0.5 Inch                          ASK your doctor about these medications        Instructions    Morning Afternoon Evening Bedtime    Biotin 5000 MCG Caps        Take  by mouth.                        CALCIUM 1200 PO        Take 1 Tab by mouth every day.   Dose:  1 Tab                        conjugated estrogen 0.625 MG/GM Crea   Commonly known as:  PREMARIN        Insert 0.5 g in vagina See Admin Instructions. Monday PM Friday AM   Dose:  0.5 g                        GLUCOSAMINE MSM COMPLEX  Tabs        Take 2 Tabs by mouth every day. 1200 mg   Dose:  2 Tab                        methenamine hip 1 GM Tabs   Commonly known as:  HIPPREX        Take 1 g by mouth every day.   Dose:  1 g                        metoprolol 25 MG Tabs   Commonly known as:  LOPRESSOR        TAKE 1 TABLET BY MOUTH TWICE DAILY                        MULTIVITAMIN PO        Take 1 Tab by mouth every day.   Dose:  1 Tab                        Omega-3 Fatty Acids 1200 MG Caps        Take 1,000 mg by mouth every day.   Dose:  1000 mg                        PROBIOTIC DAILY PO        Take  by mouth.                        rivaroxaban 20 MG Tabs tablet   Commonly known as:  XARELTO        Take 1 Tab by mouth with dinner.   Dose:  20 mg                        trazodone 50 MG Tabs   Commonly known as:  DESYREL        Take 100 mg by mouth every evening.   Dose:  100 mg                        VITAMIN B 12 PO        Take  by mouth.                        Vitamin C 500 MG Caps        Take 500 mg by mouth every day.   Dose:  500 mg                        ZETIA 10 MG Tabs   Generic drug:  ezetimibe        TAKE 0.5 TABLET BY MOUTH EVERY DAY                             Where to Get Your Medications      You can get these medications from any pharmacy     Bring a paper prescription for each of these medications    - bacitracin 500 UNIT/GM Oint              Discharge Instructions       Stye  A stye is a bump on your eyelid caused by a bacterial infection. A stye can form inside the eyelid (internal stye) or outside the eyelid (external stye). An internal stye may be caused by an infected oil-producing gland inside your eyelid. An external stye may be caused by an infection at the base of your eyelash (hair follicle).  Styes are very common. Anyone can get them at any age. They usually occur in just one eye, but you may have more than one in either eye.   CAUSES   The infection is almost always caused by bacteria called Staphylococcus aureus. This is  a common type of bacteria that lives on your skin.  RISK FACTORS  You may be at higher risk for a stye if you have had one before. You may also be at higher risk if you have:  · Diabetes.  · Long-term illness.  · Long-term eye redness.  · A skin condition called seborrhea.  · High fat levels in your blood (lipids).  SIGNS AND SYMPTOMS   Eyelid pain is the most common symptom of a stye. Internal styes are more painful than external styes. Other signs and symptoms may include:  · Painful swelling of your eyelid.  · A scratchy feeling in your eye.  · Tearing and redness of your eye.  · Pus draining from the stye.  DIAGNOSIS   Your health care provider may be able to diagnose a stye just by examining your eye. The health care provider may also check to make sure:  · You do not have a fever or other signs of a more serious infection.  · The infection has not spread to other parts of your eye or areas around your eye.  TREATMENT   Most styes will clear up in a few days without treatment. In some cases, you may need to use antibiotic drops or ointment to prevent infection. Your health care provider may have to drain the stye surgically if your stye is:  · Large.  · Causing a lot of pain.  · Interfering with your vision.  This can be done using a thin blade or a needle.   HOME CARE INSTRUCTIONS   · Take medicines only as directed by your health care provider.  · Apply a clean, warm compress to your eye for 10 minutes, 4 times a day.  · Do not wear contact lenses or eye makeup until your stye has healed.  · Do not try to pop or drain the stye.  SEEK MEDICAL CARE IF:  · You have chills or a fever.  · Your stye does not go away after several days.  · Your stye affects your vision.  · Your eyeball becomes swollen, red, or painful.  MAKE SURE YOU:  · Understand these instructions.  · Will watch your condition.  · Will get help right away if you are not doing well or get worse.     This information is not intended to replace  advice given to you by your health care provider. Make sure you discuss any questions you have with your health care provider.     Document Released: 09/27/2006 Document Revised: 01/08/2016 Document Reviewed: 04/03/2015  Elsevier Interactive Patient Education ©2016 Elsevier Inc.            Patient Information     Patient Information    Following emergency treatment: all patient requiring follow-up care must return either to a private physician or a clinic if your condition worsens before you are able to obtain further medical attention, please return to the emergency room.     Billing Information    At [facility], we work to make the billing process streamlined for our patients.  Our Representatives are here to answer any questions you may have regarding your hospital bill.  If you have insurance coverage and have supplied your insurance information to us, we will submit a claim to your insurer on your behalf.  Should you have any questions regarding your bill, we can be reached online or by phone as follows:  Online: You are able pay your bills online or live chat with our representatives about any billing questions you may have. We are here to help Monday - Friday from 8:00am to 7:30pm and 9:00am - 12:00pm on Saturdays.  Please visit [email or web address]  for more information.   Phone:  [phone] or [phone]    Please note that your emergency physician, surgeon, pathologist, radiologist, anesthesiologist, and other specialists are not employed by [facility] and will therefore bill separately for their services.  Please contact them directly for any questions concerning their bills at the numbers below:     Emergency Physician Services:  [phone]  [facility]:  [phone]  [facility]:  [phone]  [facility]:  [phone]    1. Your final bill may vary from the amount quoted upon discharge if all procedures are not  complete at that time, or if your doctor has additional procedures of which we are not aware. You will receive an additional bill if you return to the Emergency Department at [facility] for suture removal regardless of the facility of which the sutures were placed.     2. Please arrange for settlement of this account at the emergency registration.    3. All self-pay accounts are due in full at the time of treatment.  If you are unable to meet this obligation then payment is expected within 4-5 days.     4. If you have had radiology studies (CT, X-ray, Ultrasound, MRI), you have received a preliminary result during your emergency department visit. Please contact the radiology department [phone] to receive a copy of your final result. Please discuss the Final result with your primary physician or with the follow up physician provided.     Crisis Hotline:  [place] Crisis Hotline:  4-731-DUOAXSH or 1-969.299.8540  [place] Crisis Hotline:    1-161.932.9551 or 707-452-4456         ED Discharge Follow Up Questions    1. In order to provide you with very good care, we would like to follow up with a phone call in the next few days.  May we have your permission to contact you?     YES /  NO    2. What is the best phone number to call you? (       )_____-__________    3. What is the best time to call you?      Morning  /  Afternoon  /  Evening                   Patient Signature:  ____________________________________________________________    Date:  ____________________________________________________________      Your appointments     Nov 07, 2017  3:00 PM   PACER CHECK ONLY with PACER CHECK[facility]   [facility] (--)    [address], [address]  [place] [address]   [phone]            Apr 24, 2018  2:15 PM   ECHO with ECHO [facility]   ECHOCARDIOLOGY [facility] ([facility])    [address]  [place] [address]   [phone]           No prep

## 2017-05-25 NOTE — ED AVS SNAPSHOT
[employer] Access Code: Activation code not generated  Current [employer] Status: Active    [employer]  A secure, online tool to manage your health information     [employer]’s [employer]® is a secure, online tool that connects you to your personalized health information from the privacy of your home -- day or night - making it very easy for you to manage your healthcare. Once the activation process is completed, you can even access your medical information using the [employer] ivana, which is available for free in the Apple Ivana store or Google Play store.     [employer] provides the following levels of access (as shown below):   My Chart Features   [facility] Primary Care Doctor [facility]  Specialists [facility]  Urgent  Care Non-[facility]  Primary Care  Doctor   Email your healthcare team securely and privately 24/7 X X X X   Manage appointments: schedule your next appointment; view details of past/upcoming appointments X      Request prescription refills. X      View recent personal medical records, including lab and immunizations X X X X   View health record, including health history, allergies, medications X X X X   Read reports about your outpatient visits, procedures, consult and ER notes X X X X   See your discharge summary, which is a recap of your hospital and/or ER visit that includes your diagnosis, lab results, and care plan. X X       How to register for [employer]  1. Go to  [email or web address].  2. Click on the Sign Up Now box, which takes you to the New Member Sign Up page. You will need to provide the following information:  a. Enter your [employer] Access Code exactly as it appears at the top of this page. (You will not need to use this code after you’ve completed the sign-up process. If you do not sign up before the expiration date, you must request a new code.)   b. Enter your date of birth.   c. Enter your home email address.   d. Click Submit, and follow the next screen’s instructions.  3. Create a [employer] ID. This will  be your [employer] login ID and cannot be changed, so think of one that is secure and easy to remember.  4. Create a [employer] password. You can change your password at any time.  5. Enter your Password Reset Question and Answer. This can be used at a later time if you forget your password.   6. Enter your e-mail address. This allows you to receive e-mail notifications when new information is available in [employer].  7. Click Sign Up. You can now view your health information.    For assistance activating your [employer] account, call [phone]

## 2017-05-25 NOTE — ED AVS SNAPSHOT
5/25/2017    Jayna Lewis  [address]  [place] [address]    Dear Jayna:    [facility] wants to ensure your discharge home is safe and you or your loved ones have had all of your questions answered regarding your care after you leave the hospital.    Below is a list of resources and contact information should you have any questions regarding your hospital stay, follow-up instructions, or active medical symptoms.    Questions or Concerns Regarding… Contact   Medical Questions Related to Your Discharge  (7 days a week, 8am-5pm) Contact a Nurse Care Coordinator   [phone]   Medical Questions Not Related to Your Discharge  (24 hours a day / 7 days a week)  Contact the Nurse Health Line   [phone]    Medications or Discharge Instructions Refer to your discharge packet   or contact your [facility] Primary Care Provider   [phone]   Follow-up Appointment(s) Schedule your appointment via [employer]   or contact Scheduling [phone]   Billing Review your statement via [employer]  or contact Billing [phone]   Medical Records Review your records via [employer]   or contact Medical Records [phone]     You may receive a telephone call within two days of discharge. This call is to make certain you understand your discharge instructions and have the opportunity to have any questions answered. You can also easily access your medical information, test results and upcoming appointments via the [employer] free online health management tool. You can learn more and sign up at [email or web address]. For assistance setting up your [employer] account, please call [phone].    Once again, we want to ensure your discharge home is safe and that you have a clear understanding of any next steps in your care. If you have any questions or concerns, please do not hesitate to contact us, we are here for you. Thank you for choosing [facility] for your healthcare needs.    Sincerely,    Your [facility] Healthcare Team

## 2017-05-25 NOTE — ED PROVIDER NOTES
"ED Provider Note    CHIEF COMPLAINT  Chief Complaint   Patient presents with   • Conjunctivitis     possible        HPI  Jayna Lewis is a 71 y.o. female who presents to the ED with complaints of pain in the right lower lid of her right eye. Patient states that 2 days ago, started him some increasing pain to the lateral edge of the lower lid. The patient's concern because she's had \"super bug infections\" in the years past. The patient was asked for evaluation. Denies any visual changes, changes, denies any discharge.    REVIEW OF SYSTEMS  See HPI for further details. All other systems are negative.     PAST MEDICAL HISTORY  Past Medical History   Diagnosis Date   • Aneurysm of aortic root (CMS-HCC) 9/27/2011   • Benign essential hypertension 9/27/2011   • Carotid artery stenosis 9/27/2011   • Long term (current) use of anticoagulants 8/4/2011   • Dizziness 9/27/2011   • History of echocardiogram 9/27/2011   • HLD (hyperlipidemia) 9/27/2011   • History of myocardial perfusion scan 9/27/2011   • Presence of permanent cardiac pacemaker 9/27/2011   • Pulmonary embolism (CMS-HCC) 8/4/2011   • Encounter for long-term (current) use of other medications 7/22/2011   • TIA (transient ischemic attack) 9/27/2011   • History of depression 9/27/2011   • Unspecified urinary incontinence    • Other specified symptom associated with female genital organs    • Pacemaker    • Other specified disorder of intestines    • Urinary bladder disorder    • Cancer (CMS-HCC)      skin   • Stroke (CMS-HCC) 2009     TIA   • CATARACT      removed   • Pulmonary embolism (CMS-HCC) 2011   • Unspecified hemorrhagic conditions      takes coumadin   • Atrial flutter (CMS-HCC) 9/27/2011   • Paroxysmal atrial fibrillation (CMS-HCC) 9/27/2011   • SSS (sick sinus syndrome) (CMS-HCC) 7/22/2011   • Sleep apnea 9/27/2011     no CPAP       FAMILY HISTORY  No family history on file.  Patient's family history has been discussed and is been found to be " noncontributory to his present illness  SOCIAL HISTORY  Social History     Social History   • Marital Status:      Spouse Name: N/A   • Number of Children: N/A   • Years of Education: N/A     Social History Main Topics   • Smoking status: Never Smoker    • Smokeless tobacco: Never Used   • Alcohol Use: Yes      Comment: rare   • Drug Use: No   • Sexual Activity: Not on file     Other Topics Concern   • Not on file     Social History Narrative      Vu Vila M.D.        SURGICAL HISTORY  Past Surgical History   Procedure Laterality Date   • Other       BLADDER SURGERY.   • Other cardiac surgery  2010     CATHETER ABLATION - ATRIAL FLUTTER.   • Hysterectomy, total abdominal     • Pacemaker insertion  2009   • Tonsillectomy     • Other  2011     pulmonary embolism   • Rectocele repair  2/4/2014     Performed by Tanvir Eden M.D. at [facility]   • Bladder sling female  2/4/2014     Performed by Tanvir Eden M.D. at [facility]   • Cystoscopy  2/4/2014     Performed by Tanvir Eden M.D. at [facility]       CURRENT MEDICATIONS  Home Medications     Reviewed by Erik Ramesh (Pharmacy Tech) on 05/25/17 at 0944  Med List Status: Complete    Medication Last Dose Status    Ascorbic Acid (VITAMIN C) 500 MG CAPS 5/24/2017 Active    Biotin 5000 MCG Cap 5/24/2017 Active    Calcium Carbonate-Vit D-Min (CALCIUM 1200 PO) 5/24/2017 Active    conjugated estrogen (PREMARIN) 0.625 MG/GM CREA 5/22/2017 Active    Cyanocobalamin (VITAMIN B 12 PO) 5/24/2017 Active    Glucos-MSM-C-Mo-Phnygo-Sggeje (GLUCOSAMINE MSM COMPLEX) TABS 5/24/2017 Active    methenamine hip (HIPPREX) 1 GM TABS 5/25/2017 Active    metoprolol (LOPRESSOR) 25 MG Tab 5/25/2017 Active    Multiple Vitamin (MULTIVITAMIN PO) 5/24/2017 Active    Omega-3 Fatty Acids (FISH OIL) 1200 MG CAPS 5/24/2017 Active    Probiotic Product (PROBIOTIC DAILY PO) 5/24/2017 Active    rivaroxaban  "(XARELTO) 20 MG Tab tablet 5/24/2017 Active    trazodone (DESYREL) 50 MG Tab 5/24/2017 Active    ZETIA 10 MG Tab 5/25/2017 Active                ALLERGIES  Allergies   Allergen Reactions   • Levaquin Hives and Swelling   • Macrobid [Nitrofurantoin Macrocrystal] Hives and Swelling   • Sotalol      \"dizzy\"       PHYSICAL EXAM  VITAL SIGNS: /81 mmHg  Pulse 56  Temp(Src) 36.8 °C (98.2 °F)  Resp 16  Ht 1.676 m (5' 5.98\")  Wt 89.9 kg (198 lb 3.1 oz)  BMI 32.00 kg/m2  SpO2 98%   Pulse Ox interpretation. Nonhypoxic    Constitutional: Well developed, Well nourished, No acute distress, Non-toxic appearance.   HENT: Normocephalic, Atraumatic, Bilateral external ears normal, bilateral tympanic membranes normal, Oropharynx moist, No oral exudates, Nose normal.   Eyes:  Lids/Lashes right lower lid. There is a sty on the lateral edge. There is some slight erythema to the area. Conjunctiva slightly injected   Sclera normal   Cornea, normal     Neck: Supple, no cervical lymphadenopathy, no meningeal signs..   Lymphatic: No lymphadenopathy noted.     Skin: Warm, Dry, No erythema,   Extremities: Intact distal pulses, no clubbing, no cyanosis, no edema, nontender.      RADIOLOGY/PROCEDURES  None    COURSE & MEDICAL DECISION MAKING  Pertinent Labs & Imaging studies reviewed. (See chart for details)  She does have a sty to the right lower lid. Recommend warm compresses as well as all bacitracin ointment. The patient is to follow-up with her eye doctor. She sees Dr. Knowles in one week for outpatient follow-up. Return as needed.  FINAL IMPRESSION  1. Hordeolum externum of right lower eyelid       The patient will return for new or worsening symptoms and is stable at the time of discharge.    The patient is referred to a primary physician for blood pressure management, diabetic screening, and for all other preventative health concerns.    DISPOSITION:  Patient will be discharged home in stable condition.    FOLLOW UP:  Your Eye " Doctor    Schedule an appointment as soon as possible for a visit in 1 week  For re-check      OUTPATIENT MEDICATIONS:  New Prescriptions    BACITRACIN 500 UNIT/GM OINTMENT    Place 0.5 Inches in right eye 3 times a day.               Electronically signed by: Dre Allred, 5/25/2017 9:49 AM

## 2017-06-08 ENCOUNTER — TELEPHONE (OUTPATIENT)
Dept: CARDIOLOGY | Facility: MEDICAL CENTER | Age: 72
End: 2017-06-08

## 2017-07-05 ENCOUNTER — TELEPHONE (OUTPATIENT)
Dept: INTERNAL MEDICINE | Facility: MEDICAL CENTER | Age: 72
End: 2017-07-05

## 2017-07-05 NOTE — TELEPHONE ENCOUNTER
MAs-   I see that pt has appt to see me in Nov.   Pt told me it was for consult only.   Please call her and tell her I was thinking about her case.   I think going to the [facility] would be a better idea because she is seeing me for consult.   At [facility], a comprehensive yasir assessment would be done.  She would see a geriatrician (me or Sabrina Muller), a pharmacist and a .    [facility] would come up with recs and she would f/u with PCP which I know she wants to do.   She would be seen sooner than Nov.   Please give her info to [facility].   PCP can do referral to [facility].   Please let me know what she says.

## 2017-08-02 ENCOUNTER — TELEPHONE (OUTPATIENT)
Dept: CARDIOLOGY | Facility: MEDICAL CENTER | Age: 72
End: 2017-08-02

## 2017-08-02 NOTE — TELEPHONE ENCOUNTER
----- Message from Josee Akhtar sent at 8/2/2017  2:14 PM PDT -----  Regarding: patient in A-fib right now  KAYLIN/Alfredo      Patient says she is in A-fib right now. Her blood pressure is 123/100, pulse 91. She would like a call back at [phone].

## 2017-08-02 NOTE — TELEPHONE ENCOUNTER
"Pt states she has had more episodes of feeling like she is in a-fib recently and had an episode today but now is feeling better. She describes feeling lightheaded, dizzy and feeling palpitations or \"shaking\" in her head and chest. She says she has had a sinus infection which may be contributing to this. She then went on describing very vague symptoms unrelated to the a-fib episodes including a \"bulging feeling\" or like her \"chest is dropping\" in the lower left side of her chest. She states this may be that she has gained weight lately and its her \"fat\".  She was concerned mostly about the symptoms and wonders if she should \"be worried about them\".     To KAYLIN: please advise  "

## 2017-08-04 NOTE — TELEPHONE ENCOUNTER
S/w pt, she would like to FU with [facility] to discuss symptoms. Appointment scheduled for 8/16 @1520. Pt does not have a way to transmit Pacemaker reading from home, states that she would be interested in getting one so she can send in rhythms for provider to review. Educated her that I will send a message to Dominique CAMACHO to see if she can assist.     Pacemaker check scheduled as well for 8/16 @ 1415. Pt just wanting to make sure everything is working correctly.     To Dominique, is there any way you can assist? Thank you!    Dominique Molina, Med Ass't  [place] Doherty RSANDER        Caller: Unspecified (2 days ago, 3:42 PM)       Spoke with patient-- she has an older device and is not compatible with a remote monitor.  Verbalized understanding.     Thanks   Dominique

## 2017-08-16 ENCOUNTER — OFFICE VISIT (OUTPATIENT)
Dept: CARDIOLOGY | Facility: MEDICAL CENTER | Age: 72
End: 2017-08-16
Payer: MEDICARE

## 2017-08-16 ENCOUNTER — NON-PROVIDER VISIT (OUTPATIENT)
Dept: CARDIOLOGY | Facility: MEDICAL CENTER | Age: 72
End: 2017-08-16
Payer: MEDICARE

## 2017-08-16 VITALS
HEART RATE: 72 BPM | BODY MASS INDEX: 30.86 KG/M2 | OXYGEN SATURATION: 95 % | DIASTOLIC BLOOD PRESSURE: 90 MMHG | SYSTOLIC BLOOD PRESSURE: 140 MMHG | HEIGHT: 66 IN | WEIGHT: 192 LBS

## 2017-08-16 DIAGNOSIS — R42 DIZZINESS: Chronic | ICD-10-CM

## 2017-08-16 DIAGNOSIS — I48.92 ATRIAL FLUTTER, UNSPECIFIED TYPE (HCC): Chronic | ICD-10-CM

## 2017-08-16 DIAGNOSIS — I26.99 OTHER ACUTE PULMONARY EMBOLISM WITHOUT ACUTE COR PULMONALE (HCC): Chronic | ICD-10-CM

## 2017-08-16 DIAGNOSIS — G45.9 TRANSIENT CEREBRAL ISCHEMIA, UNSPECIFIED TYPE: Chronic | ICD-10-CM

## 2017-08-16 DIAGNOSIS — Z95.0 PRESENCE OF PERMANENT CARDIAC PACEMAKER: Chronic | ICD-10-CM

## 2017-08-16 DIAGNOSIS — G47.30 SLEEP APNEA, UNSPECIFIED TYPE: Chronic | ICD-10-CM

## 2017-08-16 DIAGNOSIS — I71.21 ANEURYSM OF AORTIC ROOT (HCC): Chronic | ICD-10-CM

## 2017-08-16 DIAGNOSIS — I10 BENIGN ESSENTIAL HYPERTENSION: Chronic | ICD-10-CM

## 2017-08-16 DIAGNOSIS — I49.5 SICK SINUS SYNDROME (HCC): ICD-10-CM

## 2017-08-16 DIAGNOSIS — I65.23 BILATERAL CAROTID ARTERY STENOSIS: Chronic | ICD-10-CM

## 2017-08-16 DIAGNOSIS — E78.5 DYSLIPIDEMIA: ICD-10-CM

## 2017-08-16 DIAGNOSIS — Z79.01 LONG TERM CURRENT USE OF ANTICOAGULANT THERAPY: Chronic | ICD-10-CM

## 2017-08-16 DIAGNOSIS — Z95.0 CARDIAC PACEMAKER IN SITU: ICD-10-CM

## 2017-08-16 PROCEDURE — 99214 OFFICE O/P EST MOD 30 MIN: CPT | Performed by: NURSE PRACTITIONER

## 2017-08-16 PROCEDURE — 93280 PM DEVICE PROGR EVAL DUAL: CPT | Performed by: INTERNAL MEDICINE

## 2017-08-16 ASSESSMENT — ENCOUNTER SYMPTOMS
PALPITATIONS: 1
PND: 0
CLAUDICATION: 0
MYALGIAS: 0
DIZZINESS: 1
ORTHOPNEA: 0
ABDOMINAL PAIN: 0
SHORTNESS OF BREATH: 1
FEVER: 0
COUGH: 0

## 2017-08-16 NOTE — PROGRESS NOTES
Subjective:   Jayna Lewis is a 71 y.o. female who presents today for follow up concerning palpitations associated with shortness of breath and dizziness.    She is a patient of Dr. Llanes in our office. Hx of SSS with PPM placement, HLD, TIA, PE, HTN, and afib/flutter with previous ablation in '10 on Xarelto.    She is overall doing well now but had a three day period of afib and didn't feel good during this time. Symptoms of dizziness, shortness of breath, and a heavy feeling in her chest.    Her pacer check confirmed her 3 day episode of afib, she is currently on no anti-arrhythmic therapy. She is tolerating her Xarelto with no bleeding.    She has had no episodes of chest pain, orthopnea, or peripheral edema.    Past Medical History   Diagnosis Date   • Aneurysm of aortic root (CMS-[facility]) 9/27/2011   • Benign essential hypertension 9/27/2011   • Carotid artery stenosis 9/27/2011   • Long term (current) use of anticoagulants 8/4/2011   • Dizziness 9/27/2011   • History of echocardiogram 9/27/2011   • HLD (hyperlipidemia) 9/27/2011   • History of myocardial perfusion scan 9/27/2011   • Presence of permanent cardiac pacemaker 9/27/2011   • Pulmonary embolism (CMS-HCC) 8/4/2011   • Encounter for long-term (current) use of other medications 7/22/2011   • TIA (transient ischemic attack) 9/27/2011   • History of depression 9/27/2011   • Unspecified urinary incontinence    • Other specified symptom associated with female genital organs    • Pacemaker    • Other specified disorder of intestines    • Urinary bladder disorder    • Cancer (CMS-HCC)      skin   • Stroke (CMS-HCC) 2009     TIA   • CATARACT      removed   • Pulmonary embolism (CMS-HCC) 2011   • Unspecified hemorrhagic conditions      takes coumadin   • Atrial flutter (CMS-[facility]) 9/27/2011   • Paroxysmal atrial fibrillation (CMS-[facility]) 9/27/2011   • SSS (sick sinus syndrome) (CMS-HCC) 7/22/2011   • Sleep apnea 9/27/2011     no CPAP     Past Surgical History  "  Procedure Laterality Date   • Other       BLADDER SURGERY.   • Other cardiac surgery  2010     CATHETER ABLATION - ATRIAL FLUTTER.   • Hysterectomy, total abdominal     • Pacemaker insertion  2009   • Tonsillectomy     • Other  2011     pulmonary embolism   • Rectocele repair  2/4/2014     Performed by Tanvir Eden M.D. at [facility]   • Bladder sling female  2/4/2014     Performed by Tanvir Eden M.D. at [facility]   • Cystoscopy  2/4/2014     Performed by Tanvir Eden M.D. at [facility]     History reviewed. No pertinent family history.  History   Smoking status   • Never Smoker    Smokeless tobacco   • Never Used     Allergies   Allergen Reactions   • Levaquin Hives and Swelling   • Macrobid [Nitrofurantoin Macrocrystal] Hives and Swelling   • Sotalol      \"dizzy\"     Outpatient Encounter Prescriptions as of 8/16/2017   Medication Sig Dispense Refill   • Cyanocobalamin (VITAMIN B 12 PO) Take  by mouth.     • Probiotic Product (PROBIOTIC DAILY PO) Take  by mouth.     • rivaroxaban (XARELTO) 20 MG Tab tablet Take 1 Tab by mouth with dinner. 90 Tab 0   • metoprolol (LOPRESSOR) 25 MG Tab TAKE 1 TABLET BY MOUTH TWICE DAILY 60 Tab 6   • ZETIA 10 MG Tab TAKE 0.5 TABLET BY MOUTH EVERY DAY 45 Tab 3   • Biotin 5000 MCG Cap Take  by mouth.     • methenamine hip (HIPPREX) 1 GM TABS Take 1 g by mouth every day.     • Ascorbic Acid (VITAMIN C) 500 MG CAPS Take 500 mg by mouth every day.     • Omega-3 Fatty Acids (FISH OIL) 1200 MG CAPS Take 1,000 mg by mouth every day.     • Calcium Carbonate-Vit D-Min (CALCIUM 1200 PO) Take 1 Tab by mouth every day.     • conjugated estrogen (PREMARIN) 0.625 MG/GM CREA Insert 0.5 g in vagina See Admin Instructions. Monday PM  Friday AM     • Multiple Vitamin (MULTIVITAMIN PO) Take 1 Tab by mouth every day.     • Glucos-MSM-C-Zl-Cwbyrv-Rtrixp (GLUCOSAMINE MSM COMPLEX) TABS Take 2 Tabs by mouth every day. 1200 mg     • " "bacitracin 500 UNIT/GM Ointment Place 0.5 Inches in right eye 3 times a day. 1 Tube 0   • trazodone (DESYREL) 50 MG Tab Take 100 mg by mouth every evening.       No facility-administered encounter medications on file as of 8/16/2017.     Review of Systems   Constitutional: Negative for fever and malaise/fatigue.   Respiratory: Positive for shortness of breath. Negative for cough.    Cardiovascular: Positive for palpitations. Negative for chest pain, orthopnea, claudication, leg swelling and PND.   Gastrointestinal: Negative for abdominal pain.   Musculoskeletal: Negative for myalgias.   Neurological: Positive for dizziness.   All other systems reviewed and are negative.       Objective:   /90 mmHg  Pulse 72  Ht 1.676 m (5' 6\")  Wt 87.091 kg (192 lb)  BMI 31.00 kg/m2  SpO2 95%    Physical Exam   Constitutional: She is oriented to person, place, and time. She appears well-developed and well-nourished. No distress.   HENT:   Head: Normocephalic and atraumatic.   Eyes: EOM are normal.   Neck: Normal range of motion. No JVD present.   Cardiovascular: Normal rate, regular rhythm, normal heart sounds and intact distal pulses.    No murmur heard.  Pacemaker present in left upper chest without evidence   of erosion, drainage,or erythema.     Pulmonary/Chest: Effort normal and breath sounds normal. No respiratory distress.   Abdominal: Soft. Bowel sounds are normal.   Musculoskeletal: Normal range of motion. She exhibits no edema.   Neurological: She is alert and oriented to person, place, and time.   Skin: Skin is warm and dry.   Psychiatric: She has a normal mood and affect.   Nursing note and vitals reviewed.    Lab Results   Component Value Date/Time    WBC 12.9* 02/20/2014 01:20 PM    RBC 3.12* 02/20/2014 01:20 PM    HEMOGLOBIN 10.0* 02/20/2014 01:20 PM    HEMATOCRIT 29.9* 02/20/2014 01:20 PM    MCV 96.0 02/20/2014 01:20 PM    MCH 31.9 02/20/2014 01:20 PM    MCHC 33.3 02/20/2014 01:20 PM    MPV 6.4* 02/20/2014 " 01:20 PM      Lab Results   Component Value Date/Time    CHOLESTEROL, 05/09/2016 06:50 AM    LDL 71 05/09/2016 06:50 AM    HDL 60 05/09/2016 06:50 AM    TRIGLYCERIDES 97 05/09/2016 06:50 AM       Lab Results   Component Value Date/Time    SODIUM 144 05/09/2016 06:50 AM    POTASSIUM 4.2 05/09/2016 06:50 AM    CHLORIDE 105 05/09/2016 06:50 AM    CO2 23 05/09/2016 06:50 AM    GLUCOSE 90 05/09/2016 06:50 AM    BUN 13 05/09/2016 06:50 AM    CREATININE 0.79 05/09/2016 06:50 AM    BUN-CREATININE RATIO 16 05/09/2016 06:50 AM     Lab Results   Component Value Date/Time    ALKALINE PHOSPHATASE 82 05/09/2016 06:50 AM    AST(SGOT) 21 05/09/2016 06:50 AM    ALT(SGPT) 13 05/09/2016 06:50 AM    TOTAL BILIRUBIN 0.4 05/09/2016 06:50 AM      Lab Results   Component Value Date/Time    B NATRIURETIC PEPTIDE 99 02/20/2014 01:20 PM      Lab Results   Component Value Date/Time    PT 15.8* 02/20/2014 01:20 PM    INR 1.28* 02/20/2014 01:20 PM      2015 CTA CHEST   1.  Some fusiform dilatation of the ascending thoracic aorta is noted with maximum axial dimensions of 4.7 x 4.2 cm. There is no evidence of saccular aneurysm and there is no evidence of dissection.  2.  No significant atherosclerotic plaque either calcified or noncalcified is visible in the aorta.  3.  Main branch vessels of the aorta in the chest and abdomen appear patent.  4.  Cholelithiasis.  5.  Small area of arterioportal shunting appears to be present laterally in the right lobe of the liver.  6.  Linear opacifications in each lung base are likely due to discoid atelectasis or scarring.    2014 CAROTID DUPLEX CONCLUSIONS   Very mild plaque seen in carotid artery bilaterally.   Normal vertebral flow.    2017 ECHO CONCLUSIONS  Prior echocardiogram 4/25/2016. Compared to the report of the study   done - there has been no significant change.   Normal left ventricular systolic function.  Left ventricular ejection fraction is visually estimated to be 65%.  Grade I  diastolic dysfunction.  Pacer/ICD wire seen in right ventricle.  Mild mitral regurgitation.  Mild tricuspid regurgitation.  Estimated right ventricular systolic pressure is 25 mmHg.  Ascending aorta is dilated with a diameter of 4.4 cm.    2017 NUCLEAR IMAGING INTERPRETATION   No evidence of significant jeopardized viable myocardium or prior myocardial    infarction.   Normal left ventricular wall motion.  LV ejection fraction = 70%.   ECG INTERPRETATION   Negative stress ECG for ischemia.    Assessment:     1. Atrial flutter, unspecified type (CMS-HCC)     2. Presence of permanent cardiac pacemaker     3. Aneurysm of aortic root (CMS-HCC)     4. Dizziness     5. Bilateral carotid artery stenosis     6. Other acute pulmonary embolism without acute cor pulmonale     7. Long term current use of anticoagulant therapy     8. Benign essential hypertension     9. Sleep apnea, unspecified type     10. Transient cerebral ischemia, unspecified type     11. Dyslipidemia       Medical Decision Making:  Today's Assessment / Status / Plan:     1. Afib/flutter/SSS with PPM, recommend EP consult for medication review with previous ablation hx and now symptoms associated with new event. Continue metoprolol and Xarelto.    2. PPM, check today. Mode switching with one 3 hour afib/aflutter episode-symptomatic. EP consult as above.    3. Aneurysm of aorta, stable on CTA in '15 and recent echo in '17 with no aneurysm but dilation to 4.4 cm.     4. Dizziness, only with afib events.     5. Carotid disease, mild. Follow with repeat duplex next year. Intolerant to statins- omega 3 and zetia only.    6. HTN, moderate control, better at home. Continue metoprolol for BP control.    7. SONDRA, good control. Follow with PCP.    8. TIA, no deficits. No ASA on Xarelto, no statin with intoleranace.    9. HLD, good control. FU with yearly CMP and lipid end of this year or beginning of next.    10. PE, no recurrence. Xarelto.    FU in clinic soon with  EP; 6 months with JI; order carotid duplex and consider CTA for aortic dilation; CMP and lipid before JI apt    Patient verbalizes understanding and agrees with the plan of care.     Collaborating MD: Abel ZHAO

## 2017-08-16 NOTE — MR AVS SNAPSHOT
"        Jayna Cruz Debbie   2017 3:20 PM   Office Visit   MRN: [id number]    Department:  [facility]   Dept Phone:  [phone]    Description:  Female : 1945   Provider:  CESAR Doan           Reason for Visit     Follow-Up           Allergies as of 2017     Allergen Noted Reactions    Levaquin 10/08/2014   Hives, Swelling    Macrobid [Nitrofurantoin Macrocrystal] 10/08/2014   Hives, Swelling    Sotalol 2014       \"dizzy\"      Vital Signs     Blood Pressure Pulse Height Weight Body Mass Index Oxygen Saturation    140/90 mmHg 72 1.676 m (5' 6\") 87.091 kg (192 lb) 31.00 kg/m2 95%    Smoking Status                   Never Smoker            Basic Information     Date Of Birth Sex Race Ethnicity Preferred Language    1945 Female White Non- English      Your appointments     Aug 16, 2017  2:15 PM   PACER CHECK ONLY with PACER CHECK-[facility] 2   [facility] (--)    [address], [address]  [place] [address]   [phone]            Aug 16, 2017  3:20 PM   PREVIOUS PATIENT with CESAR Doan   [facility] (--)    [address], [address]  [place] [address]   [phone]            Sep 05, 2017  1:20 PM   FOLLOW UP with Blue Duarte M.D.   [facility] (--)    [address]  [place] [address]   [phone]            2017  2:45 PM   PACER CHECK ONLY with PACER CHECK-CAM B   [facility] (--)    [address]  [place] [address]   [phone]            2018  3:15 PM   PACER CHECK ONLY with PACER CHECK-CAM B 2   [facility] (--)    [address]  [place] [address]   [phone]            2018  4:00 PM   FOLLOW UP with Ronni Llanes M.D.   [facility] (--)   "    [address]  [place] [address]   [phone]            Apr 24, 2018  2:15 PM   ECHO with ECHO [facility]   ECHOCARDIOLOGY [facility] ([facility])    [address]  [place] [address]   [phone]              Problem List              ICD-10-CM Priority Class Noted - Resolved    Aneurysm of aortic root (CMS-HCC) (Chronic) I71.9   9/27/2011 - Present    Atrial flutter (CMS-HCC) (Chronic) I48.92 Medium  9/27/2011 - Present    Benign essential hypertension (Chronic) I10 Low  9/27/2011 - Present    Carotid artery stenosis (Chronic) I65.29   9/27/2011 - Present    Long term current use of anticoagulant therapy (Chronic) Z79.01 Medium  8/4/2011 - Present    Dizziness (Chronic) R42 High  9/27/2011 - Present    History of echocardiogram (Chronic) Z92.89   9/27/2011 - Present    History of myocardial perfusion scan (Chronic) Z92.89   9/27/2011 - Present    Presence of permanent cardiac pacemaker (Chronic) Z95.0 Medium  9/27/2011 - Present    Paroxysmal atrial fibrillation (CMS-HCC) (Chronic) I48.0 Medium  9/27/2011 - Present    Pulmonary embolism (CMS-HCC) (Chronic) I26.99   8/4/2011 - Present    SSS (sick sinus syndrome) (CMS-HCC) (Chronic) I49.5 Medium  7/22/2011 - Present    Sleep apnea (Chronic) G47.30   9/27/2011 - Present    Encounter for long-term (current) use of other medications (Chronic) Z79.899   7/22/2011 - Present    TIA (transient ischemic attack) (Chronic) G45.9   9/27/2011 - Present    History of depression (Chronic) Z86.59   9/27/2011 - Present    Rectocele, female N81.6   2/4/2014 - Present    Stress incontinence in female N39.3   2/4/2014 - Present    Rectal pain K62.89   2/7/2014 - Present    Carotid stenosis I65.29   5/9/2014 - Present    Chronic anticoagulation Z79.01   5/11/2017 - Present    Cardiac pacemaker in situ Z95.0   5/11/2017 - Present    Dyslipidemia E78.5   5/11/2017 - Present      Health Maintenance        Date Due Completion Dates    IMM DTaP/Tdap/Td Vaccine (1 - Tdap)  8/23/1962 ---    PAP SMEAR 8/23/1964 ---    MAMMOGRAM 8/23/1983 ---    COLONOSCOPY 8/23/1993 ---    BONE DENSITY 8/23/2008 ---    IMM PNEUMOCOCCAL 65+ (ADULT) LOW/MEDIUM RISK SERIES (2 of 2 - PCV13) 10/1/2010 10/1/2009    IMM INFLUENZA (1) 9/1/2017 10/1/2013            Current Immunizations     Influenza TIV (IM) 10/1/2013    Pneumococcal polysaccharide vaccine (PPSV-23) 10/1/2009    SHINGLES VACCINE 10/1/2013      Below and/or attached are the medications your provider expects you to take. Review all of your home medications and newly ordered medications with your provider and/or pharmacist. Follow medication instructions as directed by your provider and/or pharmacist. Please keep your medication list with you and share with your provider. Update the information when medications are discontinued, doses are changed, or new medications (including over-the-counter products) are added; and carry medication information at all times in the event of emergency situations     Allergies:  LEVAQUIN - Hives,Swelling     MACROBID - Hives,Swelling     SOTALOL - (reactions not documented)               Medications  Valid as of: August 16, 2017 -  2:14 PM    Generic Name Brand Name Tablet Size Instructions for use    Ascorbic Acid (Cap) Vitamin C 500 MG Take 500 mg by mouth every day.        Bacitracin (Ointment) bacitracin 500 UNIT/GM Place 0.5 Inches in right eye 3 times a day.        Biotin (Cap) Biotin 5000 MCG Take  by mouth.        Calcium Carbonate-Vit D-Min   Take 1 Tab by mouth every day.        Cyanocobalamin   Take  by mouth.        Estrogens, Conjugated (Cream) PREMARIN 0.625 MG/GM Insert 0.5 g in vagina See Admin Instructions. Monday PM  Friday AM        Ezetimibe (Tab) ZETIA 10 MG TAKE 0.5 TABLET BY MOUTH EVERY DAY        Glucos-MSM-C-Vl-Pbrycu-Wmaiog (Tab) GLUCOSAMINE MSM COMPLEX  Take 2 Tabs by mouth every day. 1200 mg        Methenamine Hippurate (Tab) HIPPREX 1 GM Take 1 g by mouth every day.        Metoprolol  Tartrate (Tab) LOPRESSOR 25 MG TAKE 1 TABLET BY MOUTH TWICE DAILY        Multiple Vitamins-Minerals   Take 1 Tab by mouth every day.        Omega-3 Fatty Acids (Cap) Omega-3 Fatty Acids 1200 MG Take 1,000 mg by mouth every day.        Probiotic Product   Take  by mouth.        Rivaroxaban (Tab) XARELTO 20 MG Take 1 Tab by mouth with dinner.        TraZODone HCl (Tab) DESYREL 50 MG Take 100 mg by mouth every evening.        .                 Medicines prescribed today were sent to:     [facility] [address] [place] [address], [place] - [address] [facility] [address] [facility]    [address] [facility] [place] [address]    Phone: [phone] Fax: [phone]    Open 24 Hours?: No      Medication refill instructions:       If your prescription bottle indicates you have medication refills left, it is not necessary to call your provider’s office. Please contact your pharmacy and they will refill your medication.    If your prescription bottle indicates you do not have any refills left, you may request refills at any time through one of the following ways: The online [employer] system (except Urgent Care), by calling your provider’s office, or by asking your pharmacy to contact your provider’s office with a refill request. Medication refills are processed only during regular business hours and may not be available until the next business day. Your provider may request additional information or to have a follow-up visit with you prior to refilling your medication.   *Please Note: Medication refills are assigned a new Rx number when refilled electronically. Your pharmacy may indicate that no refills were authorized even though a new prescription for the same medication is available at the pharmacy. Please request the medicine by name with the pharmacy before contacting your provider for a refill.           [employer] Access Code: Activation code not generated  Current [employer] [facility] Status: Active

## 2017-08-16 NOTE — Clinical Note
[facility]   [address]  [place] [address]  Phone: [phone]  Fax: [phone]              Jayna Lewis  1945    Encounter Date: 8/16/2017    CESAR Doan          PROGRESS NOTE:  Subjective:   Jayna Lewis is a 71 y.o. female who presents today for follow up concerning palpitations associated with shortness of breath and dizziness.    She is a patient of Dr. Llanes in our office. Hx of SSS with PPM placement, HLD, TIA, PE, HTN, and afib/flutter with previous ablation in '10 on Xarelto.    She is overall doing well now but had a three day period of afib and didn't feel good during this time. Symptoms of dizziness, shortness of breath, and a heavy feeling in her chest.    Her pacer check confirmed her 3 day episode of afib, she is currently on no anti-arrhythmic therapy. She is tolerating her Xarelto with no bleeding.    She has had no episodes of chest pain, orthopnea, or peripheral edema.    Past Medical History   Diagnosis Date   • Aneurysm of aortic root (CMS-[facility]) 9/27/2011   • Benign essential hypertension 9/27/2011   • Carotid artery stenosis 9/27/2011   • Long term (current) use of anticoagulants 8/4/2011   • Dizziness 9/27/2011   • History of echocardiogram 9/27/2011   • HLD (hyperlipidemia) 9/27/2011   • History of myocardial perfusion scan 9/27/2011   • Presence of permanent cardiac pacemaker 9/27/2011   • Pulmonary embolism (CMS-[facility]) 8/4/2011   • Encounter for long-term (current) use of other medications 7/22/2011   • TIA (transient ischemic attack) 9/27/2011   • History of depression 9/27/2011   • Unspecified urinary incontinence    • Other specified symptom associated with female genital organs    • Pacemaker    • Other specified disorder of intestines    • Urinary bladder disorder    • Cancer (CMS-[facility])      skin   • Stroke (CMS-[facility]) 2009     TIA   • CATARACT      removed   • Pulmonary embolism (CMS-[facility]) 2011   "  • Unspecified hemorrhagic conditions      takes coumadin   • Atrial flutter (CMS-HCC) 9/27/2011   • Paroxysmal atrial fibrillation (CMS-HCC) 9/27/2011   • SSS (sick sinus syndrome) (CMS-[facility]) 7/22/2011   • Sleep apnea 9/27/2011     no CPAP     Past Surgical History   Procedure Laterality Date   • Other       BLADDER SURGERY.   • Other cardiac surgery  2010     CATHETER ABLATION - ATRIAL FLUTTER.   • Hysterectomy, total abdominal     • Pacemaker insertion  2009   • Tonsillectomy     • Other  2011     pulmonary embolism   • Rectocele repair  2/4/2014     Performed by Tanvir Eden M.D. at [facility]   • Bladder sling female  2/4/2014     Performed by Tanvir Eden M.D. [facility]   • Cystoscopy  2/4/2014     Performed by Tanvir Eden M.D. at [facility]ERY [facility] DAY [facility]     History reviewed. No pertinent family history.  History   Smoking status   • Never Smoker    Smokeless tobacco   • Never Used     Allergies   Allergen Reactions   • Levaquin Hives and Swelling   • Macrobid [Nitrofurantoin Macrocrystal] Hives and Swelling   • Sotalol      \"dizzy\"     Outpatient Encounter Prescriptions as of 8/16/2017   Medication Sig Dispense Refill   • Cyanocobalamin (VITAMIN B 12 PO) Take  by mouth.     • Probiotic Product (PROBIOTIC DAILY PO) Take  by mouth.     • rivaroxaban (XARELTO) 20 MG Tab tablet Take 1 Tab by mouth with dinner. 90 Tab 0   • metoprolol (LOPRESSOR) 25 MG Tab TAKE 1 TABLET BY MOUTH TWICE DAILY 60 Tab 6   • ZETIA 10 MG Tab TAKE 0.5 TABLET BY MOUTH EVERY DAY 45 Tab 3   • Biotin 5000 MCG Cap Take  by mouth.     • methenamine hip (HIPPREX) 1 GM TABS Take 1 g by mouth every day.     • Ascorbic Acid (VITAMIN C) 500 MG CAPS Take 500 mg by mouth every day.     • Omega-3 Fatty Acids (FISH OIL) 1200 MG CAPS Take 1,000 mg by mouth every day.     • Calcium Carbonate-Vit D-Min (CALCIUM 1200 PO) Take 1 Tab by mouth every day.     • conjugated estrogen (PREMARIN) 0.625 " "MG/GM CREA Insert 0.5 g in vagina See Admin Instructions. Monday PM  Friday AM     • Multiple Vitamin (MULTIVITAMIN PO) Take 1 Tab by mouth every day.     • Glucos-MSM-C-Nu-Cmspvk-Vomvbv (GLUCOSAMINE MSM COMPLEX) TABS Take 2 Tabs by mouth every day. 1200 mg     • bacitracin 500 UNIT/GM Ointment Place 0.5 Inches in right eye 3 times a day. 1 Tube 0   • trazodone (DESYREL) 50 MG Tab Take 100 mg by mouth every evening.       No facility-administered encounter medications on file as of 8/16/2017.     Review of Systems   Constitutional: Negative for fever and malaise/fatigue.   Respiratory: Positive for shortness of breath. Negative for cough.    Cardiovascular: Positive for palpitations. Negative for chest pain, orthopnea, claudication, leg swelling and PND.   Gastrointestinal: Negative for abdominal pain.   Musculoskeletal: Negative for myalgias.   Neurological: Positive for dizziness.   All other systems reviewed and are negative.       Objective:   /90 mmHg  Pulse 72  Ht 1.676 m (5' 6\")  Wt 87.091 kg (192 lb)  BMI 31.00 kg/m2  SpO2 95%    Physical Exam   Constitutional: She is oriented to person, place, and time. She appears well-developed and well-nourished. No distress.   HENT:   Head: Normocephalic and atraumatic.   Eyes: EOM are normal.   Neck: Normal range of motion. No JVD present.   Cardiovascular: Normal rate, regular rhythm, normal heart sounds and intact distal pulses.    No murmur heard.  Pacemaker present in left upper chest without evidence   of erosion, drainage,or erythema.     Pulmonary/Chest: Effort normal and breath sounds normal. No respiratory distress.   Abdominal: Soft. Bowel sounds are normal.   Musculoskeletal: Normal range of motion. She exhibits no edema.   Neurological: She is alert and oriented to person, place, and time.   Skin: Skin is warm and dry.   Psychiatric: She has a normal mood and affect.   Nursing note and vitals reviewed.    Lab Results   Component Value Date/Time   "    WBC 12.9* 02/20/2014 01:20 PM    RBC 3.12* 02/20/2014 01:20 PM    HEMOGLOBIN 10.0* 02/20/2014 01:20 PM    HEMATOCRIT 29.9* 02/20/2014 01:20 PM    MCV 96.0 02/20/2014 01:20 PM    MCH 31.9 02/20/2014 01:20 PM    MCHC 33.3 02/20/2014 01:20 PM    MPV 6.4* 02/20/2014 01:20 PM      Lab Results   Component Value Date/Time    CHOLESTEROL, 05/09/2016 06:50 AM    LDL 71 05/09/2016 06:50 AM    HDL 60 05/09/2016 06:50 AM    TRIGLYCERIDES 97 05/09/2016 06:50 AM       Lab Results   Component Value Date/Time    SODIUM 144 05/09/2016 06:50 AM    POTASSIUM 4.2 05/09/2016 06:50 AM    CHLORIDE 105 05/09/2016 06:50 AM    CO2 23 05/09/2016 06:50 AM    GLUCOSE 90 05/09/2016 06:50 AM    BUN 13 05/09/2016 06:50 AM    CREATININE 0.79 05/09/2016 06:50 AM    BUN-CREATININE RATIO 16 05/09/2016 06:50 AM     Lab Results   Component Value Date/Time    ALKALINE PHOSPHATASE 82 05/09/2016 06:50 AM    AST(SGOT) 21 05/09/2016 06:50 AM    ALT(SGPT) 13 05/09/2016 06:50 AM    TOTAL BILIRUBIN 0.4 05/09/2016 06:50 AM      Lab Results   Component Value Date/Time    B NATRIURETIC PEPTIDE 99 02/20/2014 01:20 PM      Lab Results   Component Value Date/Time    PT 15.8* 02/20/2014 01:20 PM    INR 1.28* 02/20/2014 01:20 PM      2015 CTA CHEST   1.  Some fusiform dilatation of the ascending thoracic aorta is noted with maximum axial dimensions of 4.7 x 4.2 cm. There is no evidence of saccular aneurysm and there is no evidence of dissection.  2.  No significant atherosclerotic plaque either calcified or noncalcified is visible in the aorta.  3.  Main branch vessels of the aorta in the chest and abdomen appear patent.  4.  Cholelithiasis.  5.  Small area of arterioportal shunting appears to be present laterally in the right lobe of the liver.  6.  Linear opacifications in each lung base are likely due to discoid atelectasis or scarring.    2014 CAROTID DUPLEX CONCLUSIONS   Very mild plaque seen in carotid artery bilaterally.   Normal vertebral  flow.    2017 ECHO CONCLUSIONS  Prior echocardiogram 4/25/2016. Compared to the report of the study   done - there has been no significant change.   Normal left ventricular systolic function.  Left ventricular ejection fraction is visually estimated to be 65%.  Grade I diastolic dysfunction.  Pacer/ICD wire seen in right ventricle.  Mild mitral regurgitation.  Mild tricuspid regurgitation.  Estimated right ventricular systolic pressure is 25 mmHg.  Ascending aorta is dilated with a diameter of 4.4 cm.    2017 NUCLEAR IMAGING INTERPRETATION   No evidence of significant jeopardized viable myocardium or prior myocardial    infarction.   Normal left ventricular wall motion.  LV ejection fraction = 70%.   ECG INTERPRETATION   Negative stress ECG for ischemia.    Assessment:     1. Atrial flutter, unspecified type (CMS-HCC)     2. Presence of permanent cardiac pacemaker     3. Aneurysm of aortic root (CMS-HCC)     4. Dizziness     5. Bilateral carotid artery stenosis     6. Other acute pulmonary embolism without acute cor pulmonale     7. Long term current use of anticoagulant therapy     8. Benign essential hypertension     9. Sleep apnea, unspecified type     10. Transient cerebral ischemia, unspecified type     11. Dyslipidemia       Medical Decision Making:  Today's Assessment / Status / Plan:     1. Afib/flutter/SSS with PPM, recommend EP consult for medication review with previous ablation hx and now symptoms associated with new event. Continue metoprolol and Xarelto.    2. PPM, check today. Mode switching with one 3 hour afib/aflutter episode-symptomatic. EP consult as above.    3. Aneurysm of aorta, stable on CTA in '15 and recent echo in '17 with no aneurysm but dilation to 4.4 cm.     4. Dizziness, only with afib events.     5. Carotid disease, mild. Follow with repeat duplex next year. Intolerant to statins- omega 3 and zetia only.    6. HTN, moderate control, better at home. Continue metoprolol for BP  control.    7. SONDRA, good control. Follow with PCP.    8. TIA, no deficits. No ASA on Xarelto, no statin with intoleranace.    9. HLD, good control. FU with yearly CMP and lipid end of this year or beginning of next.    10. PE, no recurrence. Xarelto.    FU in clinic soon with EP; 6 months with JI; order carotid duplex and consider CTA for aortic dilation; CMP and lipid before JI apt    Patient verbalizes understanding and agrees with the plan of care.     Collaborating MD: Abel ZHAO          No Recipients

## 2017-08-18 ENCOUNTER — TELEPHONE (OUTPATIENT)
Dept: CARDIOLOGY | Facility: MEDICAL CENTER | Age: 72
End: 2017-08-18

## 2017-08-18 NOTE — TELEPHONE ENCOUNTER
Pt calling concerned about her episodes of a-fib and doesn't feel like she can wait for the scheduled appt with Dr. Duarte on 9/05. She reports her BP was high this morning, 132/96 HR 86 but has gone down to 114/78. She reports she is having somewhat more frequent episodes of lightheaded, dizzy and palpitations now which she assumes is from a-fb and doesn't feel that her pacemaker is working. Was able to get pt in with EP Monday 8/21. Pt greatly appreciated. Discussed if symptoms increase or her lightheadedness episodes increase over the weekend she should go to the ER.

## 2017-08-18 NOTE — TELEPHONE ENCOUNTER
----- Message from Josee Akhtar sent at 8/18/2017  9:54 AM PDT -----  Regarding: patient calling to discuss her blood pressure  SC/Alfredo        Patient is calling to discuss her blood pressure, and she thinks she is back in A-fib. She can be reached at [phone].

## 2017-08-19 ENCOUNTER — TELEPHONE (OUTPATIENT)
Dept: CARDIOLOGY | Facility: MEDICAL CENTER | Age: 72
End: 2017-08-19

## 2017-08-19 ENCOUNTER — HOSPITAL ENCOUNTER (EMERGENCY)
Facility: MEDICAL CENTER | Age: 72
End: 2017-08-19
Attending: EMERGENCY MEDICINE
Payer: MEDICARE

## 2017-08-19 ENCOUNTER — APPOINTMENT (OUTPATIENT)
Dept: RADIOLOGY | Facility: MEDICAL CENTER | Age: 72
End: 2017-08-19
Attending: EMERGENCY MEDICINE
Payer: MEDICARE

## 2017-08-19 VITALS
HEIGHT: 66 IN | WEIGHT: 190.48 LBS | HEART RATE: 60 BPM | DIASTOLIC BLOOD PRESSURE: 83 MMHG | SYSTOLIC BLOOD PRESSURE: 136 MMHG | RESPIRATION RATE: 16 BRPM | OXYGEN SATURATION: 95 % | BODY MASS INDEX: 30.61 KG/M2 | TEMPERATURE: 97.5 F

## 2017-08-19 DIAGNOSIS — I48.0 PAROXYSMAL A-FIB (HCC): ICD-10-CM

## 2017-08-19 LAB
ALBUMIN SERPL BCP-MCNC: 4 G/DL (ref 3.2–4.9)
ALBUMIN/GLOB SERPL: 1.2 G/DL
ALP SERPL-CCNC: 87 U/L (ref 30–99)
ALT SERPL-CCNC: 16 U/L (ref 2–50)
ANION GAP SERPL CALC-SCNC: 6 MMOL/L (ref 0–11.9)
APTT PPP: 31.8 SEC (ref 24.7–36)
AST SERPL-CCNC: 16 U/L (ref 12–45)
BASOPHILS # BLD AUTO: 0.7 % (ref 0–1.8)
BASOPHILS # BLD: 0.04 K/UL (ref 0–0.12)
BILIRUB SERPL-MCNC: 0.9 MG/DL (ref 0.1–1.5)
BUN SERPL-MCNC: 20 MG/DL (ref 8–22)
CALCIUM SERPL-MCNC: 9.6 MG/DL (ref 8.5–10.5)
CHLORIDE SERPL-SCNC: 109 MMOL/L (ref 96–112)
CO2 SERPL-SCNC: 23 MMOL/L (ref 20–33)
CREAT SERPL-MCNC: 0.86 MG/DL (ref 0.5–1.4)
EKG IMPRESSION: NORMAL
EOSINOPHIL # BLD AUTO: 0.18 K/UL (ref 0–0.51)
EOSINOPHIL NFR BLD: 3 % (ref 0–6.9)
ERYTHROCYTE [DISTWIDTH] IN BLOOD BY AUTOMATED COUNT: 43.3 FL (ref 35.9–50)
GFR SERPL CREATININE-BSD FRML MDRD: >60 ML/MIN/1.73 M 2
GLOBULIN SER CALC-MCNC: 3.4 G/DL (ref 1.9–3.5)
GLUCOSE SERPL-MCNC: 100 MG/DL (ref 65–99)
HCT VFR BLD AUTO: 49 % (ref 37–47)
HGB BLD-MCNC: 17.4 G/DL (ref 12–16)
IMM GRANULOCYTES # BLD AUTO: 0.01 K/UL (ref 0–0.11)
IMM GRANULOCYTES NFR BLD AUTO: 0.2 % (ref 0–0.9)
INR PPP: 1.15 (ref 0.87–1.13)
LYMPHOCYTES # BLD AUTO: 2.69 K/UL (ref 1–4.8)
LYMPHOCYTES NFR BLD: 45.3 % (ref 22–41)
MCH RBC QN AUTO: 32.9 PG (ref 27–33)
MCHC RBC AUTO-ENTMCNC: 35.5 G/DL (ref 33.6–35)
MCV RBC AUTO: 92.6 FL (ref 81.4–97.8)
MONOCYTES # BLD AUTO: 0.55 K/UL (ref 0–0.85)
MONOCYTES NFR BLD AUTO: 9.3 % (ref 0–13.4)
NEUTROPHILS # BLD AUTO: 2.47 K/UL (ref 2–7.15)
NEUTROPHILS NFR BLD: 41.5 % (ref 44–72)
NRBC # BLD AUTO: 0 K/UL
NRBC BLD AUTO-RTO: 0 /100 WBC
PLATELET # BLD AUTO: 229 K/UL (ref 164–446)
PMV BLD AUTO: 9.5 FL (ref 9–12.9)
POTASSIUM SERPL-SCNC: 4 MMOL/L (ref 3.6–5.5)
PROT SERPL-MCNC: 7.4 G/DL (ref 6–8.2)
PROTHROMBIN TIME: 15.1 SEC (ref 12–14.6)
RBC # BLD AUTO: 5.29 M/UL (ref 4.2–5.4)
SODIUM SERPL-SCNC: 138 MMOL/L (ref 135–145)
TROPONIN I SERPL-MCNC: <0.01 NG/ML (ref 0–0.04)
TSH SERPL DL<=0.005 MIU/L-ACNC: 1.9 UIU/ML (ref 0.3–3.7)
WBC # BLD AUTO: 5.9 K/UL (ref 4.8–10.8)

## 2017-08-19 PROCEDURE — 85025 COMPLETE CBC W/AUTO DIFF WBC: CPT

## 2017-08-19 PROCEDURE — 93005 ELECTROCARDIOGRAM TRACING: CPT

## 2017-08-19 PROCEDURE — 84443 ASSAY THYROID STIM HORMONE: CPT

## 2017-08-19 PROCEDURE — A9270 NON-COVERED ITEM OR SERVICE: HCPCS | Performed by: INTERNAL MEDICINE

## 2017-08-19 PROCEDURE — 700102 HCHG RX REV CODE 250 W/ 637 OVERRIDE(OP): Performed by: INTERNAL MEDICINE

## 2017-08-19 PROCEDURE — 85610 PROTHROMBIN TIME: CPT

## 2017-08-19 PROCEDURE — 84484 ASSAY OF TROPONIN QUANT: CPT

## 2017-08-19 PROCEDURE — 99284 EMERGENCY DEPT VISIT MOD MDM: CPT

## 2017-08-19 PROCEDURE — 80053 COMPREHEN METABOLIC PANEL: CPT

## 2017-08-19 PROCEDURE — 85730 THROMBOPLASTIN TIME PARTIAL: CPT

## 2017-08-19 PROCEDURE — 71010 DX-CHEST-PORTABLE (1 VIEW): CPT

## 2017-08-19 RX ORDER — FLECAINIDE ACETATE 50 MG/1
50 TABLET ORAL 2 TIMES DAILY
Qty: 60 TAB | Refills: 0 | Status: SHIPPED | OUTPATIENT
Start: 2017-08-19 | End: 2017-09-11 | Stop reason: SDUPTHER

## 2017-08-19 RX ORDER — FLECAINIDE ACETATE 100 MG/1
50 TABLET ORAL TWICE DAILY
Status: DISCONTINUED | OUTPATIENT
Start: 2017-08-19 | End: 2017-08-19 | Stop reason: HOSPADM

## 2017-08-19 RX ADMIN — FLECAINIDE ACETATE 50 MG: 100 TABLET ORAL at 14:50

## 2017-08-19 ASSESSMENT — PAIN SCALES - GENERAL: PAINLEVEL_OUTOF10: 0

## 2017-08-19 NOTE — ED NOTES
MD at bedside prior to d/c. Pt given d/c instruction and verbalized understanding. One rx given. Pt ambulatory to lobby, gait steady. Pt took all belongings from room.

## 2017-08-19 NOTE — ED NOTES
Pharmacy called in regards to medication ordered. Pharmacy tech states she will have pharmacist look at it.

## 2017-08-19 NOTE — ED PROVIDER NOTES
"ED Provider Note    CHIEF COMPLAINT  Chief Complaint   Patient presents with   • Chest Pain     amb to triage,  intermitant  Chest heaviness starting this AM,  Hx  A fib, has pacer.  Weakness off and on, \"I've had a lot of problems recently\".  EKG done.   • Numbness     reports fingertips and lips numb.       HPI  Jayna Lewis is a 71 y.o. female who presents to the emergency department complaining that this morning she did not feel very well and she has been having episodes of palpitations. The patient says she had some chest discomfort earlier this morning related to a rapid heart rate and palpitations. The patient has a history of paroxysmal atrial fibrillation and was seen by her cardiologist recently and told that she needs a new pacemaker and she has an appointment to see Dr. tapia for this. The patient said that she had a nuclear medicine stress test 3 months ago for similar symptoms and was told that the test showed no evidence of ischemia. The patient has been taking Xarelto    REVIEW OF SYSTEMS she does not have any pain at this time, no shortness of breath or hemoptysis no fever or chills. All other systems negative    PAST MEDICAL HISTORY  Past Medical History   Diagnosis Date   • Aneurysm of aortic root (CMS-[facility]) 9/27/2011   • Benign essential hypertension 9/27/2011   • Carotid artery stenosis 9/27/2011   • Long term (current) use of anticoagulants 8/4/2011   • Dizziness 9/27/2011   • History of echocardiogram 9/27/2011   • HLD (hyperlipidemia) 9/27/2011   • History of myocardial perfusion scan 9/27/2011   • Presence of permanent cardiac pacemaker 9/27/2011   • Pulmonary embolism (CMS-[facility]) 8/4/2011   • Encounter for long-term (current) use of other medications 7/22/2011   • TIA (transient ischemic attack) 9/27/2011   • History of depression 9/27/2011   • Unspecified urinary incontinence    • Other specified symptom associated with female genital organs    • Pacemaker    • Other specified disorder of " intestines    • Urinary bladder disorder    • Cancer (CMS-HCC)      skin   • Stroke (CMS-HCC) 2009     TIA   • CATARACT      removed   • Pulmonary embolism (CMS-HCC) 2011   • Unspecified hemorrhagic conditions      takes coumadin   • Atrial flutter (CMS-HCC) 9/27/2011   • Paroxysmal atrial fibrillation (CMS-HCC) 9/27/2011   • SSS (sick sinus syndrome) (CMS-HCC) 7/22/2011   • Sleep apnea 9/27/2011     no CPAP       FAMILY HISTORY  History reviewed. No pertinent family history.    SOCIAL HISTORY  Social History     Social History   • Marital Status:      Spouse Name: N/A   • Number of Children: N/A   • Years of Education: N/A     Social History Main Topics   • Smoking status: Never Smoker    • Smokeless tobacco: Never Used   • Alcohol Use: Yes      Comment: rare   • Drug Use: No   • Sexual Activity: Not Asked     Other Topics Concern   • None     Social History Narrative       SURGICAL HISTORY  Past Surgical History   Procedure Laterality Date   • Other       BLADDER SURGERY.   • Other cardiac surgery  2010     CATHETER ABLATION - ATRIAL FLUTTER.   • Hysterectomy, total abdominal     • Pacemaker insertion  2009   • Tonsillectomy     • Other  2011     pulmonary embolism   • Rectocele repair  2/4/2014     [facility]   • Bladder sling female  2/4/2014     [facility]   • Cystoscopy  2/4/2014     [facility]       CURRENT MEDICATIONS  Home Medications     Reviewed by Naomi Aaron R.N. (Registered Nurse) on 08/19/17 at 1048  Med List Status: Partial    Medication Last Dose Status    Ascorbic Acid (VITAMIN C) 500 MG CAPS 8/16/2017 Active    bacitracin 500 UNIT/GM Ointment not taking Active    Biotin 5000 MCG Cap 8/16/2017 Active    Calcium Carbonate-Vit D-Min (CALCIUM 1200 PO) 8/16/2017 Active    conjugated estrogen (PREMARIN) 0.625 MG/GM  "CREA 8/16/2017 Active    Cyanocobalamin (VITAMIN B 12 PO) 8/16/2017 Active    Glucos-MSM-C-Vt-Umibdk-Kabuut (GLUCOSAMINE MSM COMPLEX) TABS 8/16/2017 Active    methenamine hip (HIPPREX) 1 GM TABS 8/16/2017 Active    metoprolol (LOPRESSOR) 25 MG Tab 8/16/2017 Active    Multiple Vitamin (MULTIVITAMIN PO) 8/16/2017 Active    Omega-3 Fatty Acids (FISH OIL) 1200 MG CAPS 8/16/2017 Active    Probiotic Product (PROBIOTIC DAILY PO) 8/16/2017 Active    rivaroxaban (XARELTO) 20 MG Tab tablet 8/16/2017 Active    trazodone (DESYREL) 50 MG Tab not taking Active    ZETIA 10 MG Tab 8/16/2017 Active                ALLERGIES  Allergies   Allergen Reactions   • Levaquin Hives and Swelling   • Macrobid [Nitrofurantoin Macrocrystal] Hives and Swelling   • Multaq [Dronedarone]    • Sotalol      \"dizzy\"       PHYSICAL EXAM  VITAL SIGNS: /83 mmHg  Pulse 60  Temp(Src) 36.4 °C (97.5 °F) (Temporal)  Resp 16  Ht 1.676 m (5' 5.98\")  Wt 86.4 kg (190 lb 7.6 oz)  BMI 30.76 kg/m2  SpO2 95%   Oxygen saturation is interpreted as adequate  Constitutional: Awake and verbal and nontoxic appearing  HENT: Mucus mucous membranes are moist and throat clear  Eyes: No erythema or discharge or jaundice  Neck: Trachea midline no JVD  Cardiovascular: Slightly irregular rhythm  Lungs: Clear and equal bilaterally with no apparent difficulty breathing  Abdomen/Back: Soft nontender nondistended no peritoneal findings  Skin: Warm and dry  Musculoskeletal: No acute bony deformity no leg edema or calf tenderness  Neurologic: Awake and verbal and moving all extremities    Laboratory  A CBC shows a normal white blood cell count of 5.9 and hemoglobin is adequate at 17.4, complete metabolic panel is unremarkable, troponin is normal at less than 0.01, TSH is 1.9 INR is 1.15    EKG interpretation  A 12-lead EKG shows an irregular rhythm at 86 bpm consistent with atrial fibrillation is a left axis deviation is no pathologic ST elevation or depression or T-wave " inversions in lead V2 through V5 and a cardiogram is very similar to cardiogram from February 8, 2014    Radiology  DX-CHEST-PORTABLE (1 VIEW)   Final Result      No acute cardiac or pulmonary abnormalities are identified.            MEDICAL DECISION MAKING and DISPOSITION  In the emergency department an IV was established and the patient was placed on the cardiac monitor. She remains hemodynamically stable and asymptomatic at this time. I reviewed all the findings with Dr. Wilhelm from cardiology and Dr. Wilhelm has seen the patient and advises that she can go home and the office will contact her that she is likely to require an ablation for frequent and persistent episodes of paroxysmal atrial fibrillation. Dr. Wilhelm is going to start the patient on oral flecainide. I reviewed all the above with the patient and she is to call the office tomorrow and confirm her follow-up appointment and return here if she is having new or worsening symptoms    IMPRESSION  1. Paroxysmal atrial fibrillation         Electronically signed by: Dominic Harmon, 8/19/2017 3:47 PM

## 2017-08-19 NOTE — ED AVS SNAPSHOT
Home Care Instructions                                                                                                                Jayna Lewis   MRN: [id number]    Department:  [facility], Emergency Dept   Date of Visit:  8/19/2017            [facility], Emergency Dept    [address]    [facility] [address]    Phone:  [phone]      You were seen by     Dominic Harmon M.D.      Your Diagnosis Was     Paroxysmal a-fib (CMS-[facility])     I48.0       Follow-up Information     1. Call Janes Wilhelm M.D..    Specialty:  Cardiac Electrophysiology    Why:  call the office monday and confirm office follow up this week    Contact information    [address]  [address]  [facility] [address] [phone]        Medication Information     Review all of your home medications and newly ordered medications with your primary doctor and/or pharmacist as soon as possible. Follow medication instructions as directed by your doctor and/or pharmacist.     Please keep your complete medication list with you and share with your physician. Update the information when medications are discontinued, doses are changed, or new medications (including over-the-counter products) are added; and carry medication information at all times in the event of emergency situations.               Medication List      START taking these medications        Instructions    Morning Afternoon Evening Bedtime    flecainide 50 MG tablet   Commonly known as:  TAMBOCOR        Take 1 Tab by mouth 2 times a day.   Dose:  50 mg                          ASK your doctor about these medications        Instructions    Morning Afternoon Evening Bedtime    bacitracin 500 UNIT/GM Oint        Place 0.5 Inches in right eye 3 times a day.   Dose:  0.5 Inch                        Biotin 5000 MCG Caps        Take  by mouth.                        CALCIUM 1200 PO        Take 1 Tab by mouth every day.   Dose:  1 Tab                        conjugated estrogen 0.625 MG/GM Crea   Commonly known as:  PREMARIN        Insert 0.5 g in vagina See Admin Instructions. Monday PM Friday AM   Dose:  0.5 g                        GLUCOSAMINE MSM COMPLEX Tabs        Take 2 Tabs by mouth every day. 1200 mg   Dose:  2 Tab                        methenamine hip 1 GM Tabs   Commonly known as:  HIPPREX        Take 1 g by mouth every day.   Dose:  1 g                        metoprolol 25 MG Tabs   Commonly known as:  LOPRESSOR        TAKE 1 TABLET BY MOUTH TWICE DAILY                        MULTIVITAMIN PO        Take 1 Tab by mouth every day.   Dose:  1 Tab                        Omega-3 Fatty Acids 1200 MG Caps        Take 1,000 mg by mouth every day.   Dose:  1000 mg                        PROBIOTIC DAILY PO        Take  by mouth.                        rivaroxaban 20 MG Tabs tablet   Commonly known as:  XARELTO        Take 1 Tab by mouth with dinner.   Dose:  20 mg                        trazodone 50 MG Tabs   Commonly known as:  DESYREL        Take 100 mg by mouth every evening.   Dose:  100 mg                        VITAMIN B 12 PO        Take  by mouth.                        Vitamin C 500 MG Caps        Take 500 mg by mouth every day.   Dose:  500 mg                        ZETIA 10 MG Tabs   Generic drug:  ezetimibe        TAKE 0.5 TABLET BY MOUTH EVERY DAY                             Where to Get Your Medications      You can get these medications from any pharmacy     Bring a paper prescription for each of these medications    - flecainide 50 MG tablet            Procedures and tests performed during your visit     APTT    CBC w/ Differential    Cardiac Monitoring    Complete Metabolic Panel (CMP)    DX-CHEST-PORTABLE (1 VIEW)    EKG (ER)    ESTIMATED GFR    IV Saline Lock    PT/INR    TSH (for screening thyroid dysfunction)    Troponin STAT        Discharge Instructions       Return here if you have new or worse symptoms          Patient Information     Patient  Information    Following emergency treatment: all patient requiring follow-up care must return either to a private physician or a clinic if your condition worsens before you are able to obtain further medical attention, please return to the emergency room.     Billing Information    At [facility], we work to make the billing process streamlined for our patients.  Our Representatives are here to answer any questions you may have regarding your hospital bill.  If you have insurance coverage and have supplied your insurance information to us, we will submit a claim to your insurer on your behalf.  Should you have any questions regarding your bill, we can be reached online or by phone as follows:  Online: You are able pay your bills online or live chat with our representatives about any billing questions you may have. We are here to help Monday - Friday from 8:00am to 7:30pm and 9:00am - 12:00pm on Saturdays.  Please visit [email or web address]  for more information.   Phone:  [phone] or [phone]    Please note that your emergency physician, surgeon, pathologist, radiologist, anesthesiologist, and other specialists are not employed by [facility] and will therefore bill separately for their services.  Please contact them directly for any questions concerning their bills at the numbers below:     Emergency Physician Services:  [phone]  [facility]:  [phone]  [facility]:  [phone]  [facility]:  [phone]    1. Your final bill may vary from the amount quoted upon discharge if all procedures are not complete at that time, or if your doctor has additional procedures of which we are not aware. You will receive an additional bill if you return to the Emergency Department at [facility] for suture removal regardless of the facility of which the sutures were placed.     2. Please arrange for settlement of this account  at the emergency registration.    3. All self-pay accounts are due in full at the time of treatment.  If you are unable to meet this obligation then payment is expected within 4-5 days.     4. If you have had radiology studies (CT, X-ray, Ultrasound, MRI), you have received a preliminary result during your emergency department visit. Please contact the radiology department [phone] to receive a copy of your final result. Please discuss the Final result with your primary physician or with the follow up physician provided.     Crisis Hotline:  [place] Crisis Hotline:  9-203-WWHZWOY or 1-555.109.4578  [place] Crisis Hotline:    1-319.681.5655 or 559-082-6148         ED Discharge Follow Up Questions    1. In order to provide you with very good care, we would like to follow up with a phone call in the next few days.  May we have your permission to contact you?     YES /  NO    2. What is the best phone number to call you? (       )_____-__________    3. What is the best time to call you?      Morning  /  Afternoon  /  Evening                   Patient Signature:  ____________________________________________________________    Date:  ____________________________________________________________      Your appointments     Aug 21, 2017 12:40 PM   PREVIOUS PATIENT with Radha Ball M.D.   [facility] (--)    [address]  [place] [id number] [phone]            Jan 31, 2018  3:15 PM   PACER CHECK ONLY with PACER CHECK[facility] 2   [facility] (--)    [address]  [place] [address]   [phone]            Jan 31, 2018  4:00 PM   FOLLOW UP with Ronni Llanes M.D.   [facility] (--)    [address]  [place] [address]   [phone]            Apr 24, 2018  2:15 PM   ECHO with ECHO [facility]   [facility])    [address]  [place] [address]      [phone]

## 2017-08-19 NOTE — ED NOTES
"Chief Complaint   Patient presents with   • Chest Pain     amb to triage,  intermitant  Chest heaviness starting this AM,  Hx  A fib, has pacer.  Weakness off and on, \"I've had a lot of problems recently\".  EKG done.   • Numbness     reports fingertips and lips numb.   educated in triage,  Asked to inform staff of any sig changes.  Returned to [facility], pt w/ .    "

## 2017-08-19 NOTE — TELEPHONE ENCOUNTER
Please schedule her for a pvi with me in next few week, would hold flecainide three doses before procedure.

## 2017-08-19 NOTE — ED AVS SNAPSHOT
2threads Access Code: Activation code not generated  Current 2threads Status: Active    [employer]hart  A secure, online tool to manage your health information     [employer]’s 2threads® is a secure, online tool that connects you to your personalized health information from the privacy of your home -- day or night - making it very easy for you to manage your healthcare. Once the activation process is completed, you can even access your medical information using the 2threads ivana, which is available for free in the Apple Ivana store or Google Play store.     2threads provides the following levels of access (as shown below):   My Chart Features   [facility] Primary Care Doctor [facility]  Specialists [facility]  Urgent  Care Non-[facility]  Primary Care  Doctor   Email your healthcare team securely and privately 24/7 X X X X   Manage appointments: schedule your next appointment; view details of past/upcoming appointments X      Request prescription refills. X      View recent personal medical records, including lab and immunizations X X X X   View health record, including health history, allergies, medications X X X X   Read reports about your outpatient visits, procedures, consult and ER notes X X X X   See your discharge summary, which is a recap of your hospital and/or ER visit that includes your diagnosis, lab results, and care plan. X X       How to register for 2threads:  1. Go to  [email or web address].  2. Click on the Sign Up Now box, which takes you to the New Member Sign Up page. You will need to provide the following information:  a. Enter your 2threads Access Code exactly as it appears at the top of this page. (You will not need to use this code after you’ve completed the sign-up process. If you do not sign up before the expiration date, you must request a new code.)   b. Enter your date of birth.   c. Enter your home email address.   d. Click Submit, and follow the next screen’s instructions.  3. Create a 2threads ID. This will  be your [employer] login ID and cannot be changed, so think of one that is secure and easy to remember.  4. Create a [employer] password. You can change your password at any time.  5. Enter your Password Reset Question and Answer. This can be used at a later time if you forget your password.   6. Enter your e-mail address. This allows you to receive e-mail notifications when new information is available in [employer].  7. Click Sign Up. You can now view your health information.    For assistance activating your [employer] account, call [phone]

## 2017-08-19 NOTE — CONSULTS
DATE OF SERVICE:  08/19/2017    REFERRING PHYSICIAN:  Dominic Harmon MD    REASON FOR CONSULT:  Rapid palpitations and atypical chest pain.    HISTORY OF PRESENT ILLNESS:  This is a pleasant 71-year-old white female   patient of Dr. Story and Dr. Carnes who has had a 3-4 week history of   recurrent palpitations and feeling poorly, feels a sensation in the chest with   the palpitations, came to the ER, was noted to be in atrial fibrillation with   some RVR and then back into sinus rhythm.  Patient previously had a permanent   pacemaker placed in 2009, which is a [employer] model for sick sinus   syndrome, had a flutter ablation in 2010 in [place].    PAST MEDICAL HISTORY:  Also includes previous pulmonary embolus, history of   mild carotid artery stenosis, previous history of flutter ablation in 2010,   previous TIA, history of mild depression, history of chronic anticoagulation.    PAST SURGICAL HISTORY:  Includes hysterectomy, tonsillectomy, pacemaker   insertion, rectocele repair, bladder sling repair, cystoscopy in the past.    SOCIAL HISTORY:  The patient is , has children, is retired.    OUTPATIENT MEDICATIONS:  Include the following:  Zetia, trazodone, Xarelto 20   mg a day, metoprolol 25 mg b.i.d., methenamine, and vitamins.    ALLERGIES AND INTOLERANCES:  PATIENT HAS INTOLERANCE TO MULTAQ, SOTALOL,   MACROBID, AND LEVAQUIN; DID NOT TOLERATE ANTIARRHYTHMICS IN THE PAST.    REVIEW OF SYSTEMS:  CONSTITUTIONAL:  No fevers, chills, or sweat.  PULMONARY:  No chronic cough or hemoptysis.  GASTROINTESTINAL:  No change in bowels.  No diarrhea or constipation.    Rest of the review systems negative by the AMA/CMS criteria.    PHYSICAL EXAMINATION:  GENERAL:  A white female in no acute distress.  VITAL SIGNS:  Blood pressure is 104/76, pulse 60, respirations 16.  HEENT:  JVD 7 cm.  Carotids without bruits.  LUNGS:  Clear to auscultation and percussion.  CARDIAC:  Sounds regular rate and rhythm,  normal S1, S2, without murmurs,   rubs, or gallops.  ABDOMEN:  Soft, nontender, without hepatosplenomegaly.  EXTREMITIES:  Without clubbing, cyanosis, or edema.  NEUROLOGIC:  Nonfocal.  PSYCHIATRIC:  Alert and oriented x3.  Mood and affect appropriate.    LABORATORY DATA:  CBC was normal.  Chemistries unremarkable.  Troponin 0.01.    INR 1.15.  TSH 1.9.  Negative thallium on 05/23/2017.    IMAGING WORKUP:  Echocardiogram from 05/02/2017 showing mildly dilated aortic   arch at 4.4; otherwise unremarkable.  Patient's recent pacemaker interrogation   showed 6% AFib.  Today shows more than 50% AFib.  Patient is back in sinus   rhythm now.  Initial EKG showed atrial fibrillation with controlled   ventricular response of 86.    ASSESSMENT:  1.  Recurrent paroxysmal atrial fibrillation:  The patient wishes to try low   dose antiarrhythmic medication.  We will start on low dose flecainide 50 mg   b.i.d.  Patient is already on Toprol.  Long term, patient probably can require   catheter ablation.  She wishes to have this scheduled this time.  We will try   to get this scheduled for the next week or two.  If she does well with   flecainide, of course, the catheter ablation could be cancelled.  She has had   a previous flutter ablation also.  2.  Anticoagulation:  Xarelto, continue this.  3.  Permanent pacemaker:  Checked by [email or web address] today.  Normal   function, but significant amount of atrial fibrillation.  4.  Intolerance to antiarrhythmic medications.       ____________________________________     CLAY GONZALEZ MD    MINDY / NTS    DD:  08/19/2017 13:02:11  DT:  08/19/2017 16:06:41    D#:  [id number]  Job#:  [id number]    cc: TRAN PITT MD, JACQUELINE BAUTISTA MD

## 2017-08-19 NOTE — ED AVS SNAPSHOT
8/19/2017    Jayna Lewis  [address]  [place] [address]    Dear Jayna:    [facility] wants to ensure your discharge home is safe and you or your loved ones have had all of your questions answered regarding your care after you leave the hospital.    Below is a list of resources and contact information should you have any questions regarding your hospital stay, follow-up instructions, or active medical symptoms.    Questions or Concerns Regarding… Contact   Medical Questions Related to Your Discharge  (7 days a week, 8am-5pm) Contact a Nurse Care Coordinator   [phone]   Medical Questions Not Related to Your Discharge  (24 hours a day / 7 days a week)  Contact the Nurse Health Line   [phone]    Medications or Discharge Instructions Refer to your discharge packet   or contact your [facility] Primary Care Provider   [phone]   Follow-up Appointment(s) Schedule your appointment via [employer]   or contact Scheduling [phone]   Billing Review your statement via [employer]  or contact Billing [phone]   Medical Records Review your records via [employer]   or contact Medical Records [phone]     You may receive a telephone call within two days of discharge. This call is to make certain you understand your discharge instructions and have the opportunity to have any questions answered. You can also easily access your medical information, test results and upcoming appointments via the [employer] free online health management tool. You can learn more and sign up at [employer]. For assistance setting up your [employer] account, please call [phone].    Once again, we want to ensure your discharge home is safe and that you have a clear understanding of any next steps in your care. If you have any questions or concerns, please do not hesitate to contact us, we are here for you. Thank you for choosing [facility] for your healthcare needs.    Sincerely,    Your [facility] Healthcare Team

## 2017-08-20 ENCOUNTER — PATIENT OUTREACH (OUTPATIENT)
Dept: HEALTH INFORMATION MANAGEMENT | Facility: OTHER | Age: 72
End: 2017-08-20

## 2017-08-21 ENCOUNTER — TELEPHONE (OUTPATIENT)
Dept: CARDIOLOGY | Facility: MEDICAL CENTER | Age: 72
End: 2017-08-21

## 2017-08-21 ENCOUNTER — OFFICE VISIT (OUTPATIENT)
Dept: CARDIOLOGY | Facility: MEDICAL CENTER | Age: 72
End: 2017-08-21
Payer: MEDICARE

## 2017-08-21 VITALS
SYSTOLIC BLOOD PRESSURE: 122 MMHG | BODY MASS INDEX: 31.02 KG/M2 | HEIGHT: 66 IN | WEIGHT: 193 LBS | OXYGEN SATURATION: 93 % | DIASTOLIC BLOOD PRESSURE: 74 MMHG | HEART RATE: 61 BPM

## 2017-08-21 DIAGNOSIS — Z95.0 PRESENCE OF PERMANENT CARDIAC PACEMAKER: Chronic | ICD-10-CM

## 2017-08-21 DIAGNOSIS — I71.21 ANEURYSM OF AORTIC ROOT (HCC): Chronic | ICD-10-CM

## 2017-08-21 DIAGNOSIS — Z79.01 LONG TERM CURRENT USE OF ANTICOAGULANT THERAPY: Chronic | ICD-10-CM

## 2017-08-21 DIAGNOSIS — I48.0 PAROXYSMAL ATRIAL FIBRILLATION (HCC): ICD-10-CM

## 2017-08-21 DIAGNOSIS — I48.92 ATRIAL FLUTTER, UNSPECIFIED TYPE (HCC): Chronic | ICD-10-CM

## 2017-08-21 PROCEDURE — 93288 INTERROG EVL PM/LDLS PM IP: CPT | Performed by: INTERNAL MEDICINE

## 2017-08-21 PROCEDURE — 99215 OFFICE O/P EST HI 40 MIN: CPT | Mod: 25 | Performed by: INTERNAL MEDICINE

## 2017-08-21 NOTE — TELEPHONE ENCOUNTER
Dr. Wilhelm,     This patient is scheduled for an afib ablation with you on 9-5-17. She is taking Xarelto. Would you like her to switch over to Coumadin for this procedure or continue on the Xarelto?    Thank You,  Jayda

## 2017-08-21 NOTE — PROGRESS NOTES
Arrhythmia Clinic Note (Established patient)    DOS: 8/21/2017    Chief complaint/Reason for consult: F/u PAF    Interval History:  Patient is a 72 yo F with history of SSS s/p Nichols PPM in 2009 and subsequent flutter ablation done in 2010 in [place]. She presented to ED with palpitations and on device has been going in and out of AF. She saw one of my partners, Dr. Wilhelm in the ED who initiated her on a IC agent. Since then she has not had further episodes. She is interested in ablation to get off medications however.    ROS (+ highlighted in red):  Constitutional: Fevers/chills/fatigue/weightloss  Respiratory: Shortness of breath/cough  Cardiovascular: Chest pain/palpitations/edema/orthopnea/syncope    Past Medical History   Diagnosis Date   • Aneurysm of aortic root (CMS-HCC) 9/27/2011   • Benign essential hypertension 9/27/2011   • Carotid artery stenosis 9/27/2011   • Long term (current) use of anticoagulants 8/4/2011   • Dizziness 9/27/2011   • History of echocardiogram 9/27/2011   • HLD (hyperlipidemia) 9/27/2011   • History of myocardial perfusion scan 9/27/2011   • Presence of permanent cardiac pacemaker 9/27/2011   • Pulmonary embolism (CMS-HCC) 8/4/2011   • Encounter for long-term (current) use of other medications 7/22/2011   • TIA (transient ischemic attack) 9/27/2011   • History of depression 9/27/2011   • Unspecified urinary incontinence    • Other specified symptom associated with female genital organs    • Pacemaker    • Other specified disorder of intestines    • Urinary bladder disorder    • Cancer (CMS-HCC)      skin   • Stroke (CMS-HCC) 2009     TIA   • CATARACT      removed   • Pulmonary embolism (CMS-HCC) 2011   • Unspecified hemorrhagic conditions      takes coumadin   • Atrial flutter (CMS-HCC) 9/27/2011   • Paroxysmal atrial fibrillation (CMS-HCC) 9/27/2011   • SSS (sick sinus syndrome) (CMS-HCC) 7/22/2011   • Sleep apnea 9/27/2011     no CPAP       Allergies   Allergen Reactions   •  "Levaquin Hives and Swelling   • Macrobid [Nitrofurantoin Macrocrystal] Hives and Swelling   • Multaq [Dronedarone]    • Sotalol      \"dizzy\"       Current Outpatient Prescriptions   Medication Sig Dispense Refill   • flecainide (TAMBOCOR) 50 MG tablet Take 1 Tab by mouth 2 times a day. 60 Tab 0   • Cyanocobalamin (VITAMIN B 12 PO) Take  by mouth.     • Probiotic Product (PROBIOTIC DAILY PO) Take  by mouth.     • rivaroxaban (XARELTO) 20 MG Tab tablet Take 1 Tab by mouth with dinner. 90 Tab 0   • metoprolol (LOPRESSOR) 25 MG Tab TAKE 1 TABLET BY MOUTH TWICE DAILY 60 Tab 6   • ZETIA 10 MG Tab TAKE 0.5 TABLET BY MOUTH EVERY DAY 45 Tab 3   • Biotin 5000 MCG Cap Take  by mouth.     • methenamine hip (HIPPREX) 1 GM TABS Take 1 g by mouth every day.     • Ascorbic Acid (VITAMIN C) 500 MG CAPS Take 500 mg by mouth every day.     • Omega-3 Fatty Acids (FISH OIL) 1200 MG CAPS Take 1,000 mg by mouth every day.     • Calcium Carbonate-Vit D-Min (CALCIUM 1200 PO) Take 1 Tab by mouth every day.     • conjugated estrogen (PREMARIN) 0.625 MG/GM CREA Insert 0.5 g in vagina See Admin Instructions. Monday PM  Friday AM     • Multiple Vitamin (MULTIVITAMIN PO) Take 1 Tab by mouth every day.     • Glucos-MSM-C-Hb-Zgvvvk-Zboezg (GLUCOSAMINE MSM COMPLEX) TABS Take 2 Tabs by mouth every day. 1200 mg     • bacitracin 500 UNIT/GM Ointment Place 0.5 Inches in right eye 3 times a day. 1 Tube 0   • trazodone (DESYREL) 50 MG Tab Take 100 mg by mouth every evening.       No current facility-administered medications for this visit.       Physical Exam:  Filed Vitals:    08/21/17 1234   BP: 122/74   Pulse: 61   Height: 1.676 m (5' 5.98\")   Weight: 87.544 kg (193 lb)   SpO2: 93%     General appearance: NAD, conversant   Eyes: anicteric sclerae, moist conjunctivae; no lid-lag; PERRLA  HENT: Atraumatic; oropharynx clear with moist mucous membranes and no mucosal ulcerations; normal hard and soft palate  Neck: Trachea midline; FROM, supple, no " thyromegaly or lymphadenopathy  Lungs: CTA, with normal respiratory effort and no intercostal retractions  CV: RRR, no MRGs, no JVD  Abdomen: Soft, non-tender; no masses or HSM  Extremities: No peripheral edema or extremity lymphadenopathy  Skin: Normal temperature, turgor and texture; no rash, ulcers or subcutaneous nodules  Psych: Appropriate affect, alert and oriented to person, place and time    Data:  Labs reviewed    Echo reviewed, normal LV function    EKG interpreted by me:  AF    Device interrogation showing no further AMS episodes since initiation of AAD    Impression/Plan:  1. Aneurysm of aortic root (CMS-HCC)     2. Atrial flutter, unspecified type (CMS-HCC)     3. Presence of permanent cardiac pacemaker     4. Long term current use of anticoagulant therapy     5. Paroxysmal atrial fibrillation (CMS-HCC)       -Patient with symptomatic PAF after prior PPM for SND and flutter ablation in 2010  -I discussed with her that the flecainide was working well for her without recurrence since initiation  -However she is concerned about the possible side effects of the AAD and would prefer to be off medications  -I discussed with her the AF ablation procedure (PVI + checking for block along prior CTI line) and risks including stroke, perforation, tamponade, MI, AE fistula and she wants to proceed  -Will schedule with Dr. Choco Ball MD

## 2017-08-21 NOTE — MR AVS SNAPSHOT
"        Jayna Cruz Debbie   2017 12:40 PM   Office Visit   MRN: [id number]    Department:  [facility]   Dept Phone:  [phone]    Description:  Female : 1945   Provider:  Radha Ball M.D.           Reason for Visit     Follow-Up           Allergies as of 2017     Allergen Noted Reactions    Levaquin 10/08/2014   Hives, Swelling    Macrobid [Nitrofurantoin Macrocrystal] 10/08/2014   Hives, Swelling    Multaq [Dronedarone] 2017       Sotalol 2014       \"dizzy\"      Vital Signs     Blood Pressure Pulse Height Weight Body Mass Index Oxygen Saturation    122/74 mmHg 61 1.676 m (5' 5.98\") 87.544 kg (193 lb) 31.17 kg/m2 93%    Smoking Status                   Never Smoker            Basic Information     Date Of Birth Sex Race Ethnicity Preferred Language    1945 Female White Non- English      Your appointments     Sep 01, 2017  3:15 PM   Scheduled Pre Admission with PREADMIT 5   PREADMIT TESTS [facility] (--)    [address] [facility] [place] [address]   [phone]            2018  3:15 PM   PACER CHECK ONLY with PACER CHECK-CAM B 2   [facility] (--)    [address] [place] [address] [address]  [facility] [address] [phone]            2018  4:00 PM   FOLLOW UP with Ronni Llanes M.D.   [facility] (--)    [address] [place] [address] [address]  [facility] [address] [phone]            2018  2:15 PM   ECHO with ECHO [facility]   ECHOCARDIOLOGY [facility])    [address]  [facility] [address] [phone]              Problem List              ICD-10-CM Priority Class Noted - Resolved    Aneurysm of aortic root (CMS-HCC) (Chronic) I71.9 Medium  2011 - Present    Atrial flutter (CMS-HCC) (Chronic) I48.92 Medium  2011 - Present    Benign essential hypertension (Chronic) I10 Medium  2011 - Present    Carotid artery stenosis (Chronic) I65.29 Medium  2011 - Present    Long term " current use of anticoagulant therapy (Chronic) Z79.01 Low  8/4/2011 - Present    Dizziness (Chronic) R42 Medium  9/27/2011 - Present    Presence of permanent cardiac pacemaker (Chronic) Z95.0 Medium  9/27/2011 - Present    Pulmonary embolism (CMS-HCC) (Chronic) I26.99 Medium  8/4/2011 - Present    Sleep apnea (Chronic) G47.30 Medium  9/27/2011 - Present    TIA (transient ischemic attack) (Chronic) G45.9 Medium  9/27/2011 - Present    History of depression (Chronic) Z86.59 Low  9/27/2011 - Present    Rectocele, female N81.6 Low  2/4/2014 - Present    Stress incontinence in female N39.3 Low  2/4/2014 - Present    Dyslipidemia E78.5 Medium  5/11/2017 - Present      Health Maintenance        Date Due Completion Dates    IMM DTaP/Tdap/Td Vaccine (1 - Tdap) 8/23/1962 ---    PAP SMEAR 8/23/1964 ---    MAMMOGRAM 8/23/1983 ---    COLONOSCOPY 8/23/1993 ---    BONE DENSITY 8/23/2008 ---    IMM PNEUMOCOCCAL 65+ (ADULT) LOW/MEDIUM RISK SERIES (2 of 2 - PCV13) 10/1/2010 10/1/2009    IMM INFLUENZA (1) 9/1/2017 10/1/2013            Current Immunizations     Influenza TIV (IM) 10/1/2013    Pneumococcal polysaccharide vaccine (PPSV-23) 10/1/2009    SHINGLES VACCINE 10/1/2013      Below and/or attached are the medications your provider expects you to take. Review all of your home medications and newly ordered medications with your provider and/or pharmacist. Follow medication instructions as directed by your provider and/or pharmacist. Please keep your medication list with you and share with your provider. Update the information when medications are discontinued, doses are changed, or new medications (including over-the-counter products) are added; and carry medication information at all times in the event of emergency situations     Allergies:  LEVAQUIN - Hives,Swelling     MACROBID - Hives,Swelling     MULTAQ - (reactions not documented)     SOTALOL - (reactions not documented)               Medications  Valid as of: August 21, 2017  -  1:20 PM    Generic Name Brand Name Tablet Size Instructions for use    Ascorbic Acid (Cap) Vitamin C 500 MG Take 500 mg by mouth every day.        Bacitracin (Ointment) bacitracin 500 UNIT/GM Place 0.5 Inches in right eye 3 times a day.        Biotin (Cap) Biotin 5000 MCG Take  by mouth.        Calcium Carbonate-Vit D-Min   Take 1 Tab by mouth every day.        Cyanocobalamin   Take  by mouth.        Estrogens, Conjugated (Cream) PREMARIN 0.625 MG/GM Insert 0.5 g in vagina See Admin Instructions. Monday PM  Friday AM        Ezetimibe (Tab) ZETIA 10 MG TAKE 0.5 TABLET BY MOUTH EVERY DAY        Flecainide Acetate (Tab) TAMBOCOR 50 MG Take 1 Tab by mouth 2 times a day.        Glucos-MSM-C-Oy-Cmwvuj-Dpannq (Tab) GLUCOSAMINE MSM COMPLEX  Take 2 Tabs by mouth every day. 1200 mg        Methenamine Hippurate (Tab) HIPPREX 1 GM Take 1 g by mouth every day.        Metoprolol Tartrate (Tab) LOPRESSOR 25 MG TAKE 1 TABLET BY MOUTH TWICE DAILY        Multiple Vitamins-Minerals   Take 1 Tab by mouth every day.        Omega-3 Fatty Acids (Cap) Omega-3 Fatty Acids 1200 MG Take 1,000 mg by mouth every day.        Probiotic Product   Take  by mouth.        Rivaroxaban (Tab) XARELTO 20 MG Take 1 Tab by mouth with dinner.        TraZODone HCl (Tab) DESYREL 50 MG Take 100 mg by mouth every evening.        .                 Medicines prescribed today were sent to:     [facility] [address] [place] [address] & [address] [address]    Phone: [phone] Fax: [phone]    Open 24 Hours?: No      Medication refill instructions:       If your prescription bottle indicates you have medication refills left, it is not necessary to call your provider’s office. Please contact your pharmacy and they will refill your medication.    If your prescription bottle indicates you do not have any refills left, you may request refills at any time through one of the following ways: The  online [employer] system (except Urgent Care), by calling your provider’s office, or by asking your pharmacy to contact your provider’s office with a refill request. Medication refills are processed only during regular business hours and may not be available until the next business day. Your provider may request additional information or to have a follow-up visit with you prior to refilling your medication.   *Please Note: Medication refills are assigned a new Rx number when refilled electronically. Your pharmacy may indicate that no refills were authorized even though a new prescription for the same medication is available at the pharmacy. Please request the medicine by name with the pharmacy before contacting your provider for a refill.           [employer] Access Code: Activation code not generated  Current [employer] Status: Active

## 2017-08-21 NOTE — TELEPHONE ENCOUNTER
Called patient to discuss ablation. She already had an appointment scheduled with Dr. Ball. Per patient's request, she would like to keep the appointment with Dr. Ball today and go from there.

## 2017-08-23 DIAGNOSIS — I48.92 ATRIAL FLUTTER, UNSPECIFIED TYPE (HCC): Chronic | ICD-10-CM

## 2017-08-23 RX ORDER — DABIGATRAN ETEXILATE 150 MG/1
150 CAPSULE ORAL 2 TIMES DAILY
Qty: 60 CAP | Refills: 0 | Status: SHIPPED | OUTPATIENT
Start: 2017-08-23 | End: 2017-09-27 | Stop reason: CLARIF

## 2017-08-23 NOTE — TELEPHONE ENCOUNTER
Called pt and discussed, last dose xarelto 8/28 with dinner, first dose pradaxa pm dose of 29th then BID, samples dispensed, informed pt where to  samples, advised pt not to hold any doses of pradaxa prior to procedure as no notes advising to hold.

## 2017-09-01 DIAGNOSIS — Z01.810 PRE-OPERATIVE CARDIOVASCULAR EXAMINATION: ICD-10-CM

## 2017-09-01 DIAGNOSIS — Z01.812 PRE-OPERATIVE LABORATORY EXAMINATION: ICD-10-CM

## 2017-09-01 LAB
ALBUMIN SERPL BCP-MCNC: 4 G/DL (ref 3.2–4.9)
ALBUMIN/GLOB SERPL: 1.3 G/DL
ALP SERPL-CCNC: 74 U/L (ref 30–99)
ALT SERPL-CCNC: 20 U/L (ref 2–50)
ANION GAP SERPL CALC-SCNC: 6 MMOL/L (ref 0–11.9)
AST SERPL-CCNC: 18 U/L (ref 12–45)
BASOPHILS # BLD AUTO: 1 % (ref 0–1.8)
BASOPHILS # BLD: 0.06 K/UL (ref 0–0.12)
BILIRUB SERPL-MCNC: 0.8 MG/DL (ref 0.1–1.5)
BUN SERPL-MCNC: 19 MG/DL (ref 8–22)
CALCIUM SERPL-MCNC: 9.9 MG/DL (ref 8.5–10.5)
CHLORIDE SERPL-SCNC: 104 MMOL/L (ref 96–112)
CO2 SERPL-SCNC: 29 MMOL/L (ref 20–33)
CREAT SERPL-MCNC: 0.83 MG/DL (ref 0.5–1.4)
EKG IMPRESSION: NORMAL
EOSINOPHIL # BLD AUTO: 0.22 K/UL (ref 0–0.51)
EOSINOPHIL NFR BLD: 3.8 % (ref 0–6.9)
ERYTHROCYTE [DISTWIDTH] IN BLOOD BY AUTOMATED COUNT: 44.5 FL (ref 35.9–50)
GFR SERPL CREATININE-BSD FRML MDRD: >60 ML/MIN/1.73 M 2
GLOBULIN SER CALC-MCNC: 3.1 G/DL (ref 1.9–3.5)
GLUCOSE SERPL-MCNC: 88 MG/DL (ref 65–99)
HCT VFR BLD AUTO: 44.1 % (ref 37–47)
HGB BLD-MCNC: 15 G/DL (ref 12–16)
IMM GRANULOCYTES # BLD AUTO: 0.01 K/UL (ref 0–0.11)
IMM GRANULOCYTES NFR BLD AUTO: 0.2 % (ref 0–0.9)
INR PPP: 1.11 (ref 0.87–1.13)
LYMPHOCYTES # BLD AUTO: 2.83 K/UL (ref 1–4.8)
LYMPHOCYTES NFR BLD: 48.9 % (ref 22–41)
MCH RBC QN AUTO: 32.1 PG (ref 27–33)
MCHC RBC AUTO-ENTMCNC: 34 G/DL (ref 33.6–35)
MCV RBC AUTO: 94.4 FL (ref 81.4–97.8)
MONOCYTES # BLD AUTO: 0.63 K/UL (ref 0–0.85)
MONOCYTES NFR BLD AUTO: 10.9 % (ref 0–13.4)
NEUTROPHILS # BLD AUTO: 2.04 K/UL (ref 2–7.15)
NEUTROPHILS NFR BLD: 35.2 % (ref 44–72)
NRBC # BLD AUTO: 0 K/UL
NRBC BLD AUTO-RTO: 0 /100 WBC
PLATELET # BLD AUTO: 217 K/UL (ref 164–446)
PMV BLD AUTO: 9.3 FL (ref 9–12.9)
POTASSIUM SERPL-SCNC: 4.1 MMOL/L (ref 3.6–5.5)
PROT SERPL-MCNC: 7.1 G/DL (ref 6–8.2)
PROTHROMBIN TIME: 14.7 SEC (ref 12–14.6)
RBC # BLD AUTO: 4.67 M/UL (ref 4.2–5.4)
SODIUM SERPL-SCNC: 139 MMOL/L (ref 135–145)
WBC # BLD AUTO: 5.8 K/UL (ref 4.8–10.8)

## 2017-09-01 PROCEDURE — 85610 PROTHROMBIN TIME: CPT

## 2017-09-01 PROCEDURE — 80053 COMPREHEN METABOLIC PANEL: CPT

## 2017-09-01 PROCEDURE — 85025 COMPLETE CBC W/AUTO DIFF WBC: CPT

## 2017-09-01 PROCEDURE — 93010 ELECTROCARDIOGRAM REPORT: CPT | Performed by: INTERNAL MEDICINE

## 2017-09-01 PROCEDURE — 93005 ELECTROCARDIOGRAM TRACING: CPT

## 2017-09-01 PROCEDURE — 36415 COLL VENOUS BLD VENIPUNCTURE: CPT

## 2017-09-05 ENCOUNTER — HOSPITAL ENCOUNTER (OUTPATIENT)
Facility: MEDICAL CENTER | Age: 72
End: 2017-09-06
Attending: INTERNAL MEDICINE | Admitting: INTERNAL MEDICINE
Payer: MEDICARE

## 2017-09-05 PROBLEM — I48.91 A-FIB (HCC): Status: ACTIVE | Noted: 2017-09-05

## 2017-09-05 PROCEDURE — 700101 HCHG RX REV CODE 250

## 2017-09-05 PROCEDURE — 700111 HCHG RX REV CODE 636 W/ 250 OVERRIDE (IP)

## 2017-09-05 PROCEDURE — C1732 CATH, EP, DIAG/ABL, 3D/VECT: HCPCS

## 2017-09-05 PROCEDURE — 305387 HCHG SUTURES

## 2017-09-05 PROCEDURE — 93010 ELECTROCARDIOGRAM REPORT: CPT | Performed by: INTERNAL MEDICINE

## 2017-09-05 PROCEDURE — G0378 HOSPITAL OBSERVATION PER HR: HCPCS

## 2017-09-05 PROCEDURE — 85347 COAGULATION TIME ACTIVATED: CPT | Mod: 91

## 2017-09-05 PROCEDURE — C1894 INTRO/SHEATH, NON-LASER: HCPCS

## 2017-09-05 PROCEDURE — 305545 HCHG DOUBLE TRANSDUCER

## 2017-09-05 PROCEDURE — C1759 CATH, INTRA ECHOCARDIOGRAPHY: HCPCS

## 2017-09-05 PROCEDURE — 36620 INSERTION CATHETER ARTERY: CPT

## 2017-09-05 PROCEDURE — 700102 HCHG RX REV CODE 250 W/ 637 OVERRIDE(OP): Performed by: INTERNAL MEDICINE

## 2017-09-05 PROCEDURE — 304952 HCHG R 2 PADS

## 2017-09-05 PROCEDURE — 93623 PRGRMD STIMJ&PACG IV RX NFS: CPT

## 2017-09-05 PROCEDURE — 93005 ELECTROCARDIOGRAM TRACING: CPT | Performed by: INTERNAL MEDICINE

## 2017-09-05 PROCEDURE — C1730 CATH, EP, 19 OR FEW ELECT: HCPCS

## 2017-09-05 PROCEDURE — 93325 DOPPLER ECHO COLOR FLOW MAPG: CPT

## 2017-09-05 PROCEDURE — 360995 HCHG BAYLIS TRANSSEPTAL NEEDLE

## 2017-09-05 PROCEDURE — C1731 CATH, EP, 20 OR MORE ELEC: HCPCS

## 2017-09-05 PROCEDURE — 360980 HCHG SINGLE TRANSDUCER

## 2017-09-05 PROCEDURE — 306637 HCHG MISC ORTHO ITEM RC 0274

## 2017-09-05 PROCEDURE — 93312 ECHO TRANSESOPHAGEAL: CPT | Mod: XU

## 2017-09-05 PROCEDURE — 93662 INTRACARDIAC ECG (ICE): CPT

## 2017-09-05 PROCEDURE — C1893 INTRO/SHEATH, FIXED,NON-PEEL: HCPCS

## 2017-09-05 PROCEDURE — 305383 HCHG CARTO3 REF PATCH

## 2017-09-05 PROCEDURE — A9270 NON-COVERED ITEM OR SERVICE: HCPCS | Performed by: INTERNAL MEDICINE

## 2017-09-05 PROCEDURE — 160002 HCHG RECOVERY MINUTES (STAT)

## 2017-09-05 PROCEDURE — 93613 INTRACARDIAC EPHYS 3D MAPG: CPT

## 2017-09-05 PROCEDURE — 00537 ANES CARDIAC EP PROCEDURES: CPT

## 2017-09-05 PROCEDURE — 93656 COMPRE EP EVAL ABLTJ ATR FIB: CPT

## 2017-09-05 RX ORDER — OMEPRAZOLE 20 MG/1
20 CAPSULE, DELAYED RELEASE ORAL
Status: DISCONTINUED | OUTPATIENT
Start: 2017-09-05 | End: 2017-09-06 | Stop reason: HOSPADM

## 2017-09-05 RX ORDER — EZETIMIBE 10 MG/1
10 TABLET ORAL DAILY
Status: DISCONTINUED | OUTPATIENT
Start: 2017-09-05 | End: 2017-09-06 | Stop reason: HOSPADM

## 2017-09-05 RX ORDER — ACETAMINOPHEN 325 MG/1
650 TABLET ORAL EVERY 4 HOURS PRN
Status: DISCONTINUED | OUTPATIENT
Start: 2017-09-05 | End: 2017-09-06 | Stop reason: HOSPADM

## 2017-09-05 RX ORDER — HEPARIN SODIUM,PORCINE 1000/ML
VIAL (ML) INJECTION
Status: COMPLETED
Start: 2017-09-05 | End: 2017-09-05

## 2017-09-05 RX ORDER — LIDOCAINE HYDROCHLORIDE 20 MG/ML
INJECTION, SOLUTION INFILTRATION; PERINEURAL
Status: COMPLETED
Start: 2017-09-05 | End: 2017-09-05

## 2017-09-05 RX ORDER — OXYCODONE HYDROCHLORIDE AND ACETAMINOPHEN 5; 325 MG/1; MG/1
1-2 TABLET ORAL EVERY 4 HOURS PRN
Status: DISCONTINUED | OUTPATIENT
Start: 2017-09-05 | End: 2017-09-06 | Stop reason: HOSPADM

## 2017-09-05 RX ORDER — PROTAMINE SULFATE 10 MG/ML
INJECTION, SOLUTION INTRAVENOUS
Status: DISPENSED
Start: 2017-09-05 | End: 2017-09-06

## 2017-09-05 RX ORDER — ISOPROTERENOL HYDROCHLORIDE 0.2 MG/ML
INJECTION, SOLUTION INTRAVENOUS
Status: COMPLETED
Start: 2017-09-05 | End: 2017-09-05

## 2017-09-05 RX ORDER — DABIGATRAN ETEXILATE 150 MG/1
150 CAPSULE ORAL 2 TIMES DAILY
Status: DISCONTINUED | OUTPATIENT
Start: 2017-09-05 | End: 2017-09-06 | Stop reason: HOSPADM

## 2017-09-05 RX ORDER — FLECAINIDE ACETATE 100 MG/1
50 TABLET ORAL 2 TIMES DAILY
Status: DISCONTINUED | OUTPATIENT
Start: 2017-09-05 | End: 2017-09-06 | Stop reason: HOSPADM

## 2017-09-05 RX ADMIN — ISOPROTERENOL HYDROCHLORIDE 200 MCG: 0.2 INJECTION, SOLUTION INTRACARDIAC; INTRAMUSCULAR; INTRAVENOUS; SUBCUTANEOUS at 14:44

## 2017-09-05 RX ADMIN — HEPARIN SODIUM 2000 UNITS: 200 INJECTION, SOLUTION INTRAVENOUS at 13:07

## 2017-09-05 RX ADMIN — EZETIMIBE 10 MG: 10 TABLET ORAL at 16:45

## 2017-09-05 RX ADMIN — HEPARIN SODIUM: 1000 INJECTION, SOLUTION INTRAVENOUS; SUBCUTANEOUS at 13:08

## 2017-09-05 RX ADMIN — FLECAINIDE ACETATE 50 MG: 100 TABLET ORAL at 16:45

## 2017-09-05 RX ADMIN — LIDOCAINE HYDROCHLORIDE: 20 INJECTION, SOLUTION INFILTRATION; PERINEURAL at 13:08

## 2017-09-05 RX ADMIN — METOPROLOL TARTRATE 25 MG: 25 TABLET ORAL at 16:46

## 2017-09-05 RX ADMIN — DABIGATRAN ETEXILATE MESYLATE 150 MG: 150 CAPSULE ORAL at 20:55

## 2017-09-05 ASSESSMENT — COGNITIVE AND FUNCTIONAL STATUS - GENERAL
SUGGESTED CMS G CODE MODIFIER MOBILITY: CH
SUGGESTED CMS G CODE MODIFIER DAILY ACTIVITY: CH
DAILY ACTIVITIY SCORE: 24
MOBILITY SCORE: 24

## 2017-09-05 ASSESSMENT — LIFESTYLE VARIABLES
EVER FELT BAD OR GUILTY ABOUT YOUR DRINKING: NO
TOTAL SCORE: 0
EVER_SMOKED: NEVER
HAVE PEOPLE ANNOYED YOU BY CRITICIZING YOUR DRINKING: NO
TOTAL SCORE: 0
ALCOHOL_USE: NO
EVER HAD A DRINK FIRST THING IN THE MORNING TO STEADY YOUR NERVES TO GET RID OF A HANGOVER: NO
HAVE YOU EVER FELT YOU SHOULD CUT DOWN ON YOUR DRINKING: NO
AVERAGE NUMBER OF DAYS PER WEEK YOU HAVE A DRINK CONTAINING ALCOHOL: 0
HOW MANY TIMES IN THE PAST YEAR HAVE YOU HAD 5 OR MORE DRINKS IN A DAY: 0
ON A TYPICAL DAY WHEN YOU DRINK ALCOHOL HOW MANY DRINKS DO YOU HAVE: 2
TOTAL SCORE: 0
CONSUMPTION TOTAL: NEGATIVE

## 2017-09-05 ASSESSMENT — PATIENT HEALTH QUESTIONNAIRE - PHQ9
9. THOUGHTS THAT YOU WOULD BE BETTER OFF DEAD, OR OF HURTING YOURSELF: NOT AT ALL
6. FEELING BAD ABOUT YOURSELF - OR THAT YOU ARE A FAILURE OR HAVE LET YOURSELF OR YOUR FAMILY DOWN: SEVERAL DAYS
7. TROUBLE CONCENTRATING ON THINGS, SUCH AS READING THE NEWSPAPER OR WATCHING TELEVISION: SEVERAL DAYS
2. FEELING DOWN, DEPRESSED, IRRITABLE, OR HOPELESS: SEVERAL DAYS
SUM OF ALL RESPONSES TO PHQ9 QUESTIONS 1 AND 2: 2
4. FEELING TIRED OR HAVING LITTLE ENERGY: MORE THAN HALF THE DAYS
1. LITTLE INTEREST OR PLEASURE IN DOING THINGS: SEVERAL DAYS
8. MOVING OR SPEAKING SO SLOWLY THAT OTHER PEOPLE COULD HAVE NOTICED. OR THE OPPOSITE, BEING SO FIGETY OR RESTLESS THAT YOU HAVE BEEN MOVING AROUND A LOT MORE THAN USUAL: NOT AT ALL
5. POOR APPETITE OR OVEREATING: NOT AT ALL
SUM OF ALL RESPONSES TO PHQ QUESTIONS 1-9: 8
3. TROUBLE FALLING OR STAYING ASLEEP OR SLEEPING TOO MUCH: MORE THAN HALF THE DAYS

## 2017-09-05 ASSESSMENT — PAIN SCALES - GENERAL
PAINLEVEL_OUTOF10: 0

## 2017-09-05 NOTE — CATH LAB
Electrophysiology Procedure Note    Indication:PAF despite AA meds    Procedure Performed: 1. Atrial fibrillation ablation 2. Advanced mapping using CARTO system  3. Programmed stimuli post drug infusion 4. Transesophageal echocardiography 5. Intracardiac echocardiography 6. Esophageal temperature monitoring 7. Intraarterial blood pressure monitoring 8. Frequent ACT's 9. Pre and post procedure reprogramming of PPM     Physician(s): OSMAN Wilhelm M.D.     Resident/Assistant(s): None     Anesthesia: General endotracheal anesthetic.    Anaesthesiologist: Dr Garcia    Specimen(s) Removed: None     Estimated Blood Loss:  30cc     Complications:  None    Pre-procedure ECG: A pacing    Post Procedure ECG: A pacing    Description of Procedure:   After informed written consent, the patient was brought to the EP lab in the fasting, non-sedated state. The patient was prepped and draped in the usual sterile fashion.  KAILEE showed no evidence of LA clot. Arterial line placed for monitoring. Esophageal temperature probe placed and monitored.  Femoral venous access was obtained using the modified Seldinger technique. In the left femoral vein, 3 sheaths (6,8,8 Fr) were inserted over 0.35” guidewires. In the right femoral vein, 1 sheaths (10.5 Fr) were inserted over 0.35” guidewires. A deflectable decapolar catheter was advanced to the CS position. An intracardiac echo (ICE) catheter was advanced to the right atrium. ICE was used to identify the atrial septum, left atrial appendage, and pulmonary veins and sally the anatomic structures to superimpose on our 3D electroanatomic map using CARTOSound. During the procedure, ICE was utilized to localize the ablation catheter, monitor for thrombus formation, and to exclude pericardial effusion. Intravenous heparin was administered to maintain -350 seconds. Double transseptal left heart catheterization was performed under intracardiac echo and hemodynamic guidance. A Kiana needle was  inserted into the two 8 Fr sheaths (SL1) for transseptal puncture and placement of sheaths in the left atrium. Mapping of the pulmonary veins and left atrium was performed using CARTO3 FAM and a deflectable multipolar mapping catheter ([employer] PentaRay). Wide antral circumferential ablation was performed using an 3.5 mm deflectable irrigated force contact catheter ([employer] SmartTouch) at primarily 25 W (posteriorly) to 35 W ( anteriorly)  power starting from the L sided veins and then proceeding along to the RSPV and then finally the RIPV. Bidirectional pulmonary vein antrum isolation was verified at the end of the procedure by pacing inside the vein and confirming exit block along with absence of local PV signals on the PentaRay. Isuprel was given at 20 mcg /min and no triggers were seen. The RA isthmus was checked with a Halo and CS pacing and showed block. At the end of the procedure, heparin was reversed with protamine, the catheter and sheaths were removed, and hemostasis was achieved by manual compression. PPM was reprogrammed to previous setting and leads were checked. Following recovery from anesthesia, the patient was transferred to the PPU in good condition. Dev      Total ablation time: 1900 seconds    Fluoroscopy time: 8.6 minutes     Electrophysiologic Findings:    1. Sinus  1320 ms,  ms, HV 40 ms.     Impressions:    1. Paroxysmal atrial fibrillation.    2. Successful RF ablation pulmonary vein isolation procedure.       Recommendations:  1. Resume anticoagulation.  2. Admit to monitored bedrest.

## 2017-09-05 NOTE — OR NURSING
1530 patient arrived from cath lab s/p a.fib ablation bilateral groin access dressings clean dry intact, patient denies pain, artline right radial, vss, s.o.b, n/v, n/t, plan of care discussed with patient agreeable.  1550 patient tolerating oral fluids, art line discontinued gauze tegaderm and pressure dressing applied no bleeding no hematoma.  1605 criteria met for transfer to Tele report given to Meghann RN T823 bed 2 all questions answered.  1615 patient transported to room T823 bed 2 with all her personal belongings.

## 2017-09-05 NOTE — H&P
Physician H&P    Patient ID:  Jayna Lewis  [id number]  72 y.o. female  1945    History:  Primary Diagnosis: Atrial fib    HPI:  Previous flutter RFA in [place] in 2010. PPM [place]. Recurrent PAF with AA meds for fib RFA. On pradaxa. Improved arrhythmia control with Tambocor.    Past Medical History:  has a past medical history of Aneurysm of aortic root (CMS-HCC) (9/27/2011); Atrial flutter (CMS-HCC) (9/27/2011); Benign essential hypertension (9/27/2011); Cancer (CMS-HCC) (1985); Carotid artery stenosis (9/27/2011); CATARACT; Dizziness (9/27/2011); Encounter for long-term (current) use of other medications (7/22/2011); History of depression (9/27/2011); History of echocardiogram (9/27/2011); History of myocardial perfusion scan (9/27/2011); HLD (hyperlipidemia) (9/27/2011); Infectious disease (2013); Long term (current) use of anticoagulants (8/4/2011); Other specified disorder of intestines; Pacemaker; Paroxysmal atrial fibrillation (CMS-HCC) (9/27/2011); Presence of permanent cardiac pacemaker (9/27/2011); Psychiatric problem; Pulmonary embolism (CMS-HCC) (8/4/2011); Sleep apnea (9/27/2011); Snoring; SSS (sick sinus syndrome) (CMS-HCC) (7/22/2011); Stroke (CMS-HCC) (2009); TIA (transient ischemic attack) (9/27/2011); and Unspecified urinary incontinence.  Past Surgical History:  has a past surgical history that includes other; other cardiac surgery (2010); hysterectomy, total abdominal; pacemaker insertion (2009); tonsillectomy; other (2011); rectocele repair (2/4/2014); bladder sling female (2/4/2014); and cystoscopy (2/4/2014).  Past Social History:  reports that she has never smoked. She has never used smokeless tobacco. She reports that she does not drink alcohol or use drugs.  Past Family History: History reviewed. No pertinent family history.  Allergies: Levaquin; Macrobid [nitrofurantoin macrocrystal]; Multaq [dronedarone]; and Sotalol    Current Medications:  Prior to Admission medications   "  Medication Sig Start Date End Date Taking? Authorizing Provider   dabigatran (PRADAXA) 150 MG Cap capsule Take 1 Cap by mouth 2 Times a Day. 8/23/17   Janes Wilhelm M.D.   flecainide (TAMBOCOR) 50 MG tablet Take 1 Tab by mouth 2 times a day. 8/19/17   Dominic Harmon M.D.   Cyanocobalamin (VITAMIN B 12 PO) Take  by mouth.    Not In System Provider   Probiotic Product (PROBIOTIC DAILY PO) Take  by mouth.    Not In System Provider   rivaroxaban (XARELTO) 20 MG Tab tablet Take 1 Tab by mouth with dinner. 4/25/17   Ronni Llanes M.D.   metoprolol (LOPRESSOR) 25 MG Tab TAKE 1 TABLET BY MOUTH TWICE DAILY 12/5/16   Ronni Llanes M.D.   ZETIA 10 MG Tab TAKE 0.5 TABLET BY MOUTH EVERY DAY 9/26/16   Ronni Llanes M.D.   Biotin 5000 MCG Cap Take  by mouth.    Not In System Provider   methenamine hip (HIPPREX) 1 GM TABS Take 1 g by mouth every day.    Not In System Provider   Ascorbic Acid (VITAMIN C) 500 MG CAPS Take 500 mg by mouth every day.    Not In System Provider   Omega-3 Fatty Acids (FISH OIL) 1200 MG CAPS Take 1,000 mg by mouth every day.    Not In System Provider   Calcium Carbonate-Vit D-Min (CALCIUM 1200 PO) Take 1 Tab by mouth every day.    Not In System Provider   conjugated estrogen (PREMARIN) 0.625 MG/GM CREA Insert 0.5 g in vagina See Admin Instructions. Monday PM  Friday AM    Not In System Provider   Multiple Vitamin (MULTIVITAMIN PO) Take 1 Tab by mouth every day.    Not In System Provider   Glucos-MSM-C-Fq-Xiwnuf-Omrefq (GLUCOSAMINE MSM COMPLEX) TABS Take 2 Tabs by mouth every day. 1200 mg    Not In System Provider       Review of Systems:  ROS  Blood pressure 119/77, pulse 60, temperature 36.7 °C (98 °F), resp. rate 18, height 1.676 m (5' 6\"), weight 88 kg (194 lb 0.1 oz), SpO2 96 %.    Physical Examination:  Physical Exam   Constitutional: She is oriented to person, place, and time. She appears well-developed. No distress.   HENT:   Mouth/Throat: Mucous membranes are normal.   Eyes: Conjunctivae and " EOM are normal.   Neck: No JVD present. No tracheal deviation present. No thyroid mass and no thyromegaly present.   Cardiovascular: Normal rate, regular rhythm and intact distal pulses.    No murmur heard.  Pulmonary/Chest: Effort normal and breath sounds normal. No respiratory distress. She exhibits no tenderness.   Abdominal: Soft. There is no tenderness.   Musculoskeletal: Normal range of motion. She exhibits no edema.   Neurological: She is alert and oriented to person, place, and time. She has normal strength. She displays no tremor.   Skin: Skin is warm and dry. She is not diaphoretic.   Psychiatric: She has a normal mood and affect. Her behavior is normal.   Vitals reviewed.      Impression:  1. A fib recurrent with us of AA meds for PVI and check isthmus.  The risks, benefits,and alternatives to atrial fibrillation ablation with general anesthesia were discussed in great detail. Specific risks mentioned including bleeding, infection, arteriovenous fistula/pseudoaneurysm related to sheath placement, cardiac perforation with possible tamponade requiring pericardiocentesis or possible open heart surgery were discussed. In addition,we discussed atrial fibrillation ablation specific complications including pulmonary vein stenosis, left atrial perforation (2-4%), and nerve damage involving the recurrent laryngeal nerve, the vagus nerve with resulting gastroparesis, and the phrenic nerve.  Also mentioned was a 1/400 (0.25%) occurrence of atrial esophageal fistula, which is an abnormal communication between the esophagus and the left atrium; this complication is often fatal. Lastly the risks of death, myocardial infarction, stroke, DVT and PE were discussed. The patient verbalized understanding of these potential complications and wishes to proceed with this procedure.  The risks, benefits, and alternatives to transesophageal echocardiogram with IV sedation were discussed with the patient in specific detail,  including oropharyngeal and esophageal traumas including hoarseness and dysphagia after the procedure. Rare cases demonstrating serious or fatal complications associated with transesophageal echocardiogram have been reported in the adult population, including cardiac, pulmonary and bleeding complications in less than 1% of people. Patients with an identified intracardiac thrombus are at increased risk for embolic events and this appears to be reduced with anticoagulant therapy. The patient verbalized understandings about these  possible complications and wishes to proceed with this procedure          Pre Procedure Assessment:  Pre-Procedure Assessment - Applicable for patients receiving sedation by non-anesthesia practitioner.  Previous drug history, other anesthetic experiences, potential anesthetic problems and choice of anesthesia assessed, discussed with patient.     No prior complications.  Results of relevant diagnostic studies reviewed.    Plan of Sedation:  Patient assessed immediately prior to sedationPatient deemed appropriate candidate for conscious sedation options and plans discussed with patient / family    ASA 3 - Patient with severe systemic disease

## 2017-09-06 VITALS
BODY MASS INDEX: 31.18 KG/M2 | OXYGEN SATURATION: 94 % | RESPIRATION RATE: 15 BRPM | SYSTOLIC BLOOD PRESSURE: 106 MMHG | WEIGHT: 194 LBS | HEIGHT: 66 IN | TEMPERATURE: 97.9 F | DIASTOLIC BLOOD PRESSURE: 65 MMHG | HEART RATE: 60 BPM

## 2017-09-06 LAB
ACT BLD: 285 SEC (ref 74–137)
ACT BLD: 307 SEC (ref 74–137)
ACT BLD: 345 SEC (ref 74–137)
ALBUMIN SERPL BCP-MCNC: 3.1 G/DL (ref 3.2–4.9)
ALBUMIN/GLOB SERPL: 1.1 G/DL
ALP SERPL-CCNC: 59 U/L (ref 30–99)
ALT SERPL-CCNC: 14 U/L (ref 2–50)
ANION GAP SERPL CALC-SCNC: 8 MMOL/L (ref 0–11.9)
AST SERPL-CCNC: 20 U/L (ref 12–45)
BILIRUB SERPL-MCNC: 0.5 MG/DL (ref 0.1–1.5)
BUN SERPL-MCNC: 18 MG/DL (ref 8–22)
CALCIUM SERPL-MCNC: 8.9 MG/DL (ref 8.5–10.5)
CHLORIDE SERPL-SCNC: 104 MMOL/L (ref 96–112)
CO2 SERPL-SCNC: 26 MMOL/L (ref 20–33)
CREAT SERPL-MCNC: 0.78 MG/DL (ref 0.5–1.4)
EKG IMPRESSION: NORMAL
EKG IMPRESSION: NORMAL
ERYTHROCYTE [DISTWIDTH] IN BLOOD BY AUTOMATED COUNT: 45.2 FL (ref 35.9–50)
GFR SERPL CREATININE-BSD FRML MDRD: >60 ML/MIN/1.73 M 2
GLOBULIN SER CALC-MCNC: 2.7 G/DL (ref 1.9–3.5)
GLUCOSE SERPL-MCNC: 129 MG/DL (ref 65–99)
HCT VFR BLD AUTO: 38 % (ref 37–47)
HGB BLD-MCNC: 13 G/DL (ref 12–16)
MCH RBC QN AUTO: 32.7 PG (ref 27–33)
MCHC RBC AUTO-ENTMCNC: 34.2 G/DL (ref 33.6–35)
MCV RBC AUTO: 95.7 FL (ref 81.4–97.8)
PLATELET # BLD AUTO: 174 K/UL (ref 164–446)
PMV BLD AUTO: 9.7 FL (ref 9–12.9)
POTASSIUM SERPL-SCNC: 3.5 MMOL/L (ref 3.6–5.5)
PROT SERPL-MCNC: 5.8 G/DL (ref 6–8.2)
RBC # BLD AUTO: 3.97 M/UL (ref 4.2–5.4)
SODIUM SERPL-SCNC: 138 MMOL/L (ref 135–145)
WBC # BLD AUTO: 7.2 K/UL (ref 4.8–10.8)

## 2017-09-06 PROCEDURE — 93005 ELECTROCARDIOGRAM TRACING: CPT | Performed by: INTERNAL MEDICINE

## 2017-09-06 PROCEDURE — 700102 HCHG RX REV CODE 250 W/ 637 OVERRIDE(OP): Performed by: NURSE PRACTITIONER

## 2017-09-06 PROCEDURE — 93010 ELECTROCARDIOGRAM REPORT: CPT | Performed by: INTERNAL MEDICINE

## 2017-09-06 PROCEDURE — A9270 NON-COVERED ITEM OR SERVICE: HCPCS | Performed by: INTERNAL MEDICINE

## 2017-09-06 PROCEDURE — 80053 COMPREHEN METABOLIC PANEL: CPT

## 2017-09-06 PROCEDURE — A9270 NON-COVERED ITEM OR SERVICE: HCPCS | Performed by: NURSE PRACTITIONER

## 2017-09-06 PROCEDURE — 36415 COLL VENOUS BLD VENIPUNCTURE: CPT

## 2017-09-06 PROCEDURE — G0378 HOSPITAL OBSERVATION PER HR: HCPCS

## 2017-09-06 PROCEDURE — 700102 HCHG RX REV CODE 250 W/ 637 OVERRIDE(OP): Performed by: INTERNAL MEDICINE

## 2017-09-06 PROCEDURE — 85027 COMPLETE CBC AUTOMATED: CPT

## 2017-09-06 RX ORDER — OMEPRAZOLE 20 MG/1
20 CAPSULE, DELAYED RELEASE ORAL
Qty: 30 CAP | Refills: 0 | Status: SHIPPED | OUTPATIENT
Start: 2017-09-06 | End: 2018-01-31

## 2017-09-06 RX ORDER — POTASSIUM CHLORIDE 20 MEQ/1
20 TABLET, EXTENDED RELEASE ORAL ONCE
Status: COMPLETED | OUTPATIENT
Start: 2017-09-06 | End: 2017-09-06

## 2017-09-06 RX ADMIN — EZETIMIBE 10 MG: 10 TABLET ORAL at 08:32

## 2017-09-06 RX ADMIN — METOPROLOL TARTRATE 25 MG: 25 TABLET ORAL at 08:32

## 2017-09-06 RX ADMIN — FLECAINIDE ACETATE 50 MG: 100 TABLET ORAL at 05:16

## 2017-09-06 RX ADMIN — POTASSIUM CHLORIDE 20 MEQ: 1500 TABLET, EXTENDED RELEASE ORAL at 11:39

## 2017-09-06 RX ADMIN — DABIGATRAN ETEXILATE MESYLATE 150 MG: 150 CAPSULE ORAL at 08:32

## 2017-09-06 ASSESSMENT — PAIN SCALES - GENERAL
PAINLEVEL_OUTOF10: 0

## 2017-09-06 NOTE — PROGRESS NOTES
Patient up from PPU, report received.  Patient laying flat until 1930.  Patient understands and has no further questions at this time.  VSS.  Bilat groin sites are covered and dressings are clean, dry and intact.

## 2017-09-06 NOTE — PROGRESS NOTES
Discharge instructions given to patient at bedside, verbalizes understanding and states plans for follow-up with PCP as recommended. Individualized instruction and post ablation discharge information provided ,cardiac diet, new and home medication review, post-discharge activity level, and worsening of symptoms needing follow-up care. Telemetry monitor/IV cathlon removed. All belongings accounted for, all questions answered at this time.

## 2017-09-06 NOTE — CARE PLAN
Problem: Knowledge Deficit  Goal: Knowledge of disease process/condition, treatment plan, diagnostic tests, and medications will improve  Outcome: PROGRESSING AS EXPECTED  Pt updated about plan of care, pt expresses verbal understanding

## 2017-09-06 NOTE — DISCHARGE INSTRUCTIONS
Post ablation instructions: No lifting > 10 lbs x 1 week.  No baths or hot tubs x 1 week.  May shower on Thursday and take off dressings.  Continue to monitor sites daily for warmth, redness, discolored drainage   Please walk and take deep breaths after discharge.   After discharge if if experiences chest pain, shortness of breath, hemoptysis, neurological changes, high fever 101, pre syncope/syncope, trouble with catheter sites needs to be seen in the emergency dept.    Discharge Instructions    Discharged to home by car with relative. Discharged via walking, hospital escort: Refused.  Special equipment needed: Not Applicable    Be sure to schedule a follow-up appointment with your primary care doctor or any specialists as instructed.     Discharge Plan:   Diet Plan: Discussed  Activity Level: Discussed  Confirmed Follow up Appointment: Appointment Scheduled  Confirmed Symptoms Management: Discussed  Medication Reconciliation Updated: Yes  Influenza Vaccine Indication: Patient Refuses    I understand that a diet low in cholesterol, fat, and sodium is recommended for good health. Unless I have been given specific instructions below for another diet, I accept this instruction as my diet prescription.   Other diet: cardiac    Special Instructions: None    · Is patient discharged on Warfarin / Coumadin?   No     · Is patient Post Blood Transfusion?  No    Depression / Suicide Risk    As you are discharged from this [facility] facility, it is important to learn how to keep safe from harming yourself.    Recognize the warning signs:  · Abrupt changes in personality, positive or negative- including increase in energy   · Giving away possessions  · Change in eating patterns- significant weight changes-  positive or negative  · Change in sleeping patterns- unable to sleep or sleeping all the time   · Unwillingness or inability to communicate  · Depression  · Unusual sadness, discouragement and loneliness  · Talk of  wanting to die  · Neglect of personal appearance   · Rebelliousness- reckless behavior  · Withdrawal from people/activities they love  · Confusion- inability to concentrate     If you or a loved one observes any of these behaviors or has concerns about self-harm, here's what you can do:  · Talk about it- your feelings and reasons for harming yourself  · Remove any means that you might use to hurt yourself (examples: pills, rope, extension cords, firearm)  · Get professional help from the community (Mental Health, Substance Abuse, psychological counseling)  · Do not be alone:Call your Safe Contact- someone whom you trust who will be there for you.  · Call your local CRISIS HOTLINE 805-5110 or 996-707-7431  · Call your local Children's Mobile Crisis Response Team [place] (326) 858-9939 or www.[employer]  · Call the toll free National Suicide Prevention Hotlines   · National Suicide Prevention Lifeline 651-510-RVWT (1744)  · [employer] Line Network 800-SUICIDE (935-5350)    Cardiac Ablation   Cardiac ablation is a procedure to stop some heart tissue from causing problems. The heart has many electrical connections. Sometimes these connections cause the heart to beat very fast or irregularly. Removing some of the problem areas can improve heart rhythm or make it normal. Ablation is done for people who:   · Have Eladia-Parkinson-White syndrome.  · Have other fast heart rhythms (tachycardia).  · Have taken medicines for an abnormal heart rhythm (arrhythmia) and the medicines had:  ¨ No success.  ¨ Side effects.  · May have a type of heartbeat that could cause death.  BEFORE THE PROCEDURE   · Follow instructions from your doctor about eating and drinking before the procedure.  · Take your medicines as told by your doctor. Take them at regular times with water unless told differently by your doctor.  · If you are taking diabetes medicine, ask your doctor how to take it. Ask if there are any special instructions  you should follow. Your doctor may change how much insulin you take the day of the procedure.  PROCEDURE   · A special type of X-ray will be used. The X-ray helps your doctor see images of your heart during the procedure.  · A small cut (incision) will be made in your neck or groin.  · An IV tube will be started before the procedure begins.  · You will be given a numbing medicine (anesthetic) or a medicine to help you relax (sedative).  · The skin on your neck or groin will be numbed.  · A needle will be put into a large vein in your neck or groin.  · A thin, flexible tube (catheter) will be put in to reach your heart.  · A dye will be put in the tube. The dye will show up on X-rays. It will help your doctor see the area of the heart that needs treatment.  · When the heart tissue that is causing problems is found, the tip of the tube will send an electrical current to it. This will stop it from causing problems.  · The tube will be taken out.  · Pressure will be put on the area where the tube was. This will keep it from bleeding. A bandage will be placed over the area.  AFTER THE PROCEDURE  · You will be taken to a recovery area. Your blood pressure, heart rate, and breathing will be watched. The area where the tube was will also be watched for bleeding.  · You will need to lie still for 4-6 hours. This keeps the area where the tube was from bleeding.     This information is not intended to replace advice given to you by your health care provider. Make sure you discuss any questions you have with your health care provider.     Document Released: 08/20/2014 Document Revised: 01/08/2016 Document Reviewed: 08/20/2014  [employer] Interactive Patient Education ©2016 [employer]

## 2017-09-06 NOTE — PROGRESS NOTES
R going site oozing, groin dressing saturated, dressing removed, manual pressure held. Dr. Wilhelm rounding at bedside, recommended sandbag and extending bedrest to at least 2015. Dressing replaced.  Sandbag placed

## 2017-09-06 NOTE — PROGRESS NOTES
Assumed pt care at 1900, bedside report received.  Pt in no distress.  Denies any chest pain or SOB.  Pt's bilateral groin sites are clean, dry, and intact no longer bleeding.  Dr Llanes updated about pt status, per MD ok to give pradaxa tonight since site no longer bleeding.  [place] also discontinued.  Will continue to monitor and follow plan of care.

## 2017-09-06 NOTE — DISCHARGE SUMMARY
DATE OF ADMISSION:  09/05/2017    DATE OF DISCHARGE:  09/06/2017    DISCHARGE DIAGNOSES:  1.  Paroxysmal atrial fibrillation, intolerance of multiple antiarrhythmic   medications including Multaq and sotalol.  2.  History of transient ischemic attack.  3.  History of pulmonary embolus.  4.  Presence of cardiac pacemaker.  5.  History of atrial flutter with prior atrial flutter ablation in 2010.  6.  Dyslipidemia.    HISTORY OF PRESENT HOSPITALIZATION:  The patient has a history of paroxysmal   atrial fibrillation with prior atrial flutter ablation in [place], [place]   in 2010.  With recurrence of atrial fibrillation, she has a [employer]   pacemaker in place.  She has been intolerant of both Multaq and sotalol in the   past.  She has been anticoagulated on Pradaxa with a history of TIA as well   as pulmonary embolus in the past.  She has had improved arrhythmia control on   Tambocor, but wishes to be off medications if possible.  Patient therefore was   admitted electively on 09/05/2017 by Dr. Janes Wilhelm for pulmonary vein   isolation as arranged by Dr. Ball.  Patient underwent the procedure without   complications.  Please see procedure report for entire procedure history.    Briefly, the impressions were:  1.  Paroxysmal atrial fibrillation.  2.  Successful RF ablation, pulmonary vein isolation procedure.    Recommendations were to resume anticoagulation and current antiarrhythmic   medications.  Post-procedure, she will be followed closely in the office in   regards to arrhythmia and current medications.  It should be noted that Zoloft   will be added to her medical regimen.  Her current medical regimen includes   Pradaxa 150 mg twice daily, Zetia 10 mg daily, flecainide 50 mg twice daily,   metoprolol 25 mg twice daily, omeprazole 20 mg half hour before breakfast for   1 month post-procedure.  Patient's database review reveals stable blood   pressure at 138/84, the high, and low of 119/73.  She is  afebrile at 36.9.    Weight is stable at 194 pounds pre and post-procedure, H and H is 13.0 and   38.0 this morning.  Potassium was slightly low at 3.5.  She will receive some   potassium before going home today.  She has been on Pradaxa for   anticoagulation.  Patient will be seen in followup in the office in   approximately 1 week.  Discharge instructions were given as noted.    IMPRESSION:  1.  Paroxysmal atrial fibrillation despite antiarrhythmic medications.  2.  Pulmonary vein isolation by Dr. Janes Wilhelm on 2017.  3.  History of atrial flutter ablation in .  4.  History of pulmonary embolus.  5.  Chronic anticoagulation.  6.  [employer] dual-chamber pacemaker.  7.  History of sleep apnea.  8.  Transient ischemic attack.  9.  Dyslipidemia.  10.  Carotid artery stenosis.  11.  Aneurysm of aortic root.  12.  Hypertension.    PLAN:  The patient will be discharged home.  She understands to avoid tub   bathing for 5 days or heavy lifting for 1 week.  She may shower tomorrow and   remove the dressings in the groin as well as the right radial artery site.    All catheter sites were intact without problems this morning.  She will report   to the emergency room for any of the followin.  Temperature of 101 or greater, increasing chest pain, increasing shortness   of breath.  2.  Focal neurological changes.  3.  Hemoptysis or any problems with the catheter access site.  She will be   seen in the office in followup in 3-4 weeks.       ____________________________________     SAMUEL Pedersen / MARCI    DD:  2017 09:36:18  DT:  2017 10:09:21    D#:  [id number]  Job#:  [id number]

## 2017-09-11 ENCOUNTER — TELEPHONE (OUTPATIENT)
Dept: CARDIOLOGY | Facility: MEDICAL CENTER | Age: 72
End: 2017-09-11

## 2017-09-11 DIAGNOSIS — I48.0 PAF (PAROXYSMAL ATRIAL FIBRILLATION) (HCC): ICD-10-CM

## 2017-09-11 RX ORDER — FLECAINIDE ACETATE 100 MG/1
100 TABLET ORAL 2 TIMES DAILY
Qty: 180 TAB | Refills: 3 | Status: SHIPPED | OUTPATIENT
Start: 2017-09-11 | End: 2017-09-14

## 2017-09-13 ENCOUNTER — TELEPHONE (OUTPATIENT)
Dept: CARDIOLOGY | Facility: MEDICAL CENTER | Age: 72
End: 2017-09-13

## 2017-09-13 DIAGNOSIS — I48.92 ATRIAL FLUTTER, UNSPECIFIED TYPE (HCC): Chronic | ICD-10-CM

## 2017-09-13 NOTE — TELEPHONE ENCOUNTER
----- Message from Radha Wiseman, Med Ass't sent at 9/13/2017  2:53 PM PDT -----  Regarding: new RX  Patient states that she is not taking the 100mg flecainide and would like to go back to the 50mg dose. She is requesting a new RX to her pharmacy. Please call patient back to confirm  Thank you

## 2017-09-14 RX ORDER — FLECAINIDE ACETATE 50 MG/1
50 TABLET ORAL 2 TIMES DAILY
Qty: 180 TAB | Refills: 3 | OUTPATIENT
Start: 2017-09-14

## 2017-09-14 NOTE — TELEPHONE ENCOUNTER
Spoke with pt, pt reports she took 100mg BID when she felt she was on Afib but went back to Flecainide 50mg BID, pt requesting to switch back to Flecainide 50mg BID and if she could just take extra dose as needed if she feels like she is in Afib. Advise pt that will call Rx for Flacainide 50mg PO BID, may take additional dose of 50mg PO as needed with total of 100mg PO BID as per Dr Wilhelm note from 9/11/17.     SARITA to EDIS.

## 2017-09-26 DIAGNOSIS — I10 ESSENTIAL HYPERTENSION: ICD-10-CM

## 2017-09-27 ENCOUNTER — OFFICE VISIT (OUTPATIENT)
Dept: CARDIOLOGY | Facility: MEDICAL CENTER | Age: 72
End: 2017-09-27
Payer: MEDICARE

## 2017-09-27 VITALS
BODY MASS INDEX: 31.5 KG/M2 | HEART RATE: 80 BPM | HEIGHT: 66 IN | OXYGEN SATURATION: 92 % | SYSTOLIC BLOOD PRESSURE: 90 MMHG | WEIGHT: 196 LBS | DIASTOLIC BLOOD PRESSURE: 70 MMHG

## 2017-09-27 DIAGNOSIS — I48.92 ATRIAL FLUTTER, UNSPECIFIED TYPE (HCC): ICD-10-CM

## 2017-09-27 DIAGNOSIS — I48.0 PAROXYSMAL ATRIAL FIBRILLATION (HCC): ICD-10-CM

## 2017-09-27 DIAGNOSIS — Z79.01 LONG TERM CURRENT USE OF ANTICOAGULANT THERAPY: Chronic | ICD-10-CM

## 2017-09-27 DIAGNOSIS — Z95.0 PRESENCE OF PERMANENT CARDIAC PACEMAKER: Chronic | ICD-10-CM

## 2017-09-27 DIAGNOSIS — G45.9 TRANSIENT CEREBRAL ISCHEMIA, UNSPECIFIED TYPE: Chronic | ICD-10-CM

## 2017-09-27 LAB — EKG IMPRESSION: NORMAL

## 2017-09-27 PROCEDURE — 99214 OFFICE O/P EST MOD 30 MIN: CPT | Performed by: NURSE PRACTITIONER

## 2017-09-27 PROCEDURE — 93000 ELECTROCARDIOGRAM COMPLETE: CPT | Performed by: NURSE PRACTITIONER

## 2017-09-27 ASSESSMENT — ENCOUNTER SYMPTOMS
HEARTBURN: 0
BRUISES/BLEEDS EASILY: 0
COUGH: 0
MYALGIAS: 0
NAUSEA: 0
ABDOMINAL PAIN: 0
LOSS OF CONSCIOUSNESS: 0
BLURRED VISION: 0
FOCAL WEAKNESS: 0
HEADACHES: 0
PALPITATIONS: 0
CLAUDICATION: 0
FEVER: 0
ORTHOPNEA: 0
BLOOD IN STOOL: 0
DIZZINESS: 0
VOMITING: 0
CHILLS: 0
WHEEZING: 0
PND: 0
DOUBLE VISION: 0
PSYCHIATRIC NEGATIVE: 1
SHORTNESS OF BREATH: 0
SORE THROAT: 0
SPEECH CHANGE: 0

## 2017-09-27 NOTE — LETTER
[facility]   [address], [address]  [place] [address]  Phone: [phone]  Fax: [phone]              Jayna Lewis  1945    Encounter Date: 9/27/2017    DARRION AlmazanPSANDER          PROGRESS NOTE:  No notes on file      Vu Vila M.D.  [address]  [address] [address]  VIA Facsimile: [phone]

## 2017-09-28 NOTE — PROGRESS NOTES
Subjective:   Jayna Lewis is a 72 y.o. female who presents today for review of her cardiac arrhythmia status. She underwent PVI with Dr Wilhelm on 17.  She commented she had to take an extra dose of Flecainide X 2 since procedure. The episodes on her device however, did not coincide with the extra AA dosing.  Her device interrogation revealed episodes on 9/10- the longest was just over 3 hours.  None since the .  Groin sites have healed nicely. No residual issues. Arterial site uncomplicated.  Pacemaker site uncomplicated.   She continues on AC but wishes to switch to Xarelto as she had samples of this medication.  HISTORY OF PRESENT HOSPITALIZATION:  The patient has a history of paroxysmal   atrial fibrillation with prior atrial flutter ablation in [place], [place]   in .  With recurrence of atrial fibrillation, she has a [employer]   pacemaker in place.  She has been intolerant of both Multaq and sotalol in the   past.  She has been anticoagulated on Pradaxa with a history of TIA as well   as pulmonary embolus in the past.  She has had improved arrhythmia control on   Tambocor, but wishes to be off medications if possible.  Patient therefore was   admitted electively on 2017 by Dr. Janes Wilhelm for pulmonary vein   isolation as arranged by Dr. Ball.  Patient underwent the procedure without   complications.  Please see procedure report for entire procedure history.    Briefly, the impressions were:  1.  Paroxysmal atrial fibrillation.  2.  Successful RF ablation, pulmonary vein isolation procedure.     Past Medical History:   Diagnosis Date   • Infectious disease     pt acquired infection post op and states she almost , pt unsure of what the infection was   • Aneurysm of aortic root (CMS-HCC) 2011   • Benign essential hypertension 2011   • Carotid artery stenosis 2011   • Dizziness 2011   • History of echocardiogram 2011   • HLD  (hyperlipidemia) 9/27/2011   • History of myocardial perfusion scan 9/27/2011   • Presence of permanent cardiac pacemaker 9/27/2011   • TIA (transient ischemic attack) 9/27/2011   • History of depression 9/27/2011   • Atrial flutter (CMS-HCC) 9/27/2011   • Paroxysmal atrial fibrillation (CMS-HCC) 9/27/2011   • Sleep apnea 9/27/2011    pt no longer uses cpap, pt was told by md that she no longer had sleep apnea s/p weight loss   • Long term (current) use of anticoagulants 8/4/2011   • Pulmonary embolism (CMS-HCC) 8/4/2011   • Encounter for long-term (current) use of other medications 7/22/2011   • SSS (sick sinus syndrome) (CMS-HCC) 7/22/2011   • Stroke (CMS-HCC) 2009    TIA- suspected   • Cancer (CMS-HCC) 1985    skin- basal cell   • CATARACT     saadia IOL   • Other specified disorder of intestines     constipation; IBS   • Pacemaker    • Psychiatric problem     depression   • Snoring     sleep study done   • Unspecified urinary incontinence      Past Surgical History:   Procedure Laterality Date   • RECTOCELE REPAIR  2/4/2014    Performed by Tanvir Eden M.D. at [facility]   • BLADDER SLING FEMALE  2/4/2014    Performed by Tanvir Eden M.D. at [facility]   • CYSTOSCOPY  2/4/2014    Performed by Tanvir Eden M.D. [facility]   • OTHER  2011    pulmonary embolism   • OTHER CARDIAC SURGERY  2010    CATHETER ABLATION - ATRIAL FLUTTER.   • PACEMAKER INSERTION  2009   • HYSTERECTOMY, TOTAL ABDOMINAL     • OTHER      BLADDER SURGERY.   • TONSILLECTOMY       History reviewed. No pertinent family history.  History   Smoking Status   • Never Smoker   Smokeless Tobacco   • Never Used     Allergies   Allergen Reactions   • Levaquin Hives and Swelling     Hives, swelling in throat   • Macrobid [Nitrofurantoin Macrocrystal] Hives and Swelling     Hives, swelling in throat   • Multaq [Dronedarone] Hives and Swelling     Hives, swelling in throat   • Sotalol   "    \"dizzy\", feels off balance     Outpatient Encounter Prescriptions as of 9/27/2017   Medication Sig Dispense Refill   • sertraline (ZOLOFT) 50 MG Tab      • rivaroxaban (XARELTO) 20 MG Tab tablet Take 1 Tab by mouth with dinner. 90 Tab 3   • flecainide (TAMBOCOR) 50 MG tablet Take 1 Tab by mouth 2 times a day. May take additional dose of 50mg PO as needed for Atrial Fibrillation/Palpitations with total of 100mg PO BID. 180 Tab 3   • Cyanocobalamin (VITAMIN B 12 PO) Take  by mouth.     • Probiotic Product (PROBIOTIC DAILY PO) Take  by mouth.     • metoprolol (LOPRESSOR) 25 MG Tab TAKE 1 TABLET BY MOUTH TWICE DAILY 60 Tab 6   • ZETIA 10 MG Tab TAKE 0.5 TABLET BY MOUTH EVERY DAY 45 Tab 3   • Biotin 5000 MCG Cap Take  by mouth.     • methenamine hip (HIPPREX) 1 GM TABS Take 1 g by mouth every day.     • Ascorbic Acid (VITAMIN C) 500 MG CAPS Take 500 mg by mouth every day.     • Omega-3 Fatty Acids (FISH OIL) 1200 MG CAPS Take 1,000 mg by mouth every day.     • Calcium Carbonate-Vit D-Min (CALCIUM 1200 PO) Take 1 Tab by mouth every day.     • conjugated estrogen (PREMARIN) 0.625 MG/GM CREA Insert 0.5 g in vagina See Admin Instructions. Monday PM  Friday AM     • Multiple Vitamin (MULTIVITAMIN PO) Take 1 Tab by mouth every day.     • Glucos-MSM-C-Aw-Qdtgbs-Oacxhe (GLUCOSAMINE MSM COMPLEX) TABS Take 2 Tabs by mouth every day. 1200 mg     • omeprazole (PRILOSEC) 20 MG delayed-release capsule Take 1 Cap by mouth every morning before breakfast. 30 Cap 0   • [DISCONTINUED] dabigatran (PRADAXA) 150 MG Cap capsule Take 1 Cap by mouth 2 Times a Day. 60 Cap 0     No facility-administered encounter medications on file as of 9/27/2017.      Review of Systems   Constitutional: Negative for chills and fever.   HENT: Negative for sore throat.         No difficulty swallowing   Eyes: Negative for blurred vision and double vision.   Respiratory: Negative for cough, shortness of breath and wheezing.    Cardiovascular: Negative for " "chest pain, palpitations, orthopnea, claudication, leg swelling and PND.   Gastrointestinal: Negative for abdominal pain, blood in stool, heartburn, nausea and vomiting.   Genitourinary: Negative for dysuria, frequency, hematuria and urgency.   Musculoskeletal: Negative for myalgias.   Skin: Negative.    Neurological: Negative for dizziness, speech change, focal weakness, loss of consciousness and headaches.   Endo/Heme/Allergies: Does not bruise/bleed easily.   Psychiatric/Behavioral: Negative.         Objective:   BP (!) 90/70   Pulse 80   Ht 1.676 m (5' 6\")   Wt 88.9 kg (196 lb)   LMP  (LMP Unknown)   SpO2 92%   BMI 31.64 kg/m²     Physical Exam   Constitutional: She is oriented to person, place, and time. She appears well-developed and well-nourished.   HENT:   Head: Normocephalic and atraumatic.   Eyes: Pupils are equal, round, and reactive to light.   Neck: Normal range of motion. Neck supple. No thyromegaly present.   Cardiovascular: Normal rate, regular rhythm, normal heart sounds and intact distal pulses.  Exam reveals no gallop and no friction rub.    No murmur heard.  Groin catheter access sites are uncomplicated.   Pulmonary/Chest: Effort normal and breath sounds normal. No respiratory distress. She has no wheezes. She has no rales. She exhibits no tenderness.   Device site is uncomplicated.   Abdominal: Soft. Bowel sounds are normal. She exhibits no distension. There is no tenderness. There is no guarding.   Musculoskeletal: Normal range of motion. She exhibits no edema.   Neurological: She is alert and oriented to person, place, and time.   Skin: Skin is warm and dry.   Psychiatric: She has a normal mood and affect.       Assessment:     1. Atrial flutter, unspecified type (CMS-HCC)  EKG   2. Paroxysmal atrial fibrillation (CMS-HCC)     3. Long term current use of anticoagulant therapy     4. Presence of permanent cardiac pacemaker     5. Transient cerebral ischemia, unspecified type   "       Medical Decision Making:  Today's Assessment / Status / Plan:     1. AFL quiescent.  2. PAF no episodes since 9/12/17 post PVI on 9/5/17.  3. AC wishes to switch to Xarelto 20 mgs daily which is the samples she has at home from Pradaxa.  4. PPM demonstrating appropriate function.  5. Prior TIA continue NOAC. She has asked about coming off AC. She continue on AA will need time and review of etiology of TIA per Dr Wilhelm.   REview of reasons to report to ER were again reviewed with the patient.   Continue to monitor sites daily for warmth, redness, discolored drainage  Please walk and take deep breaths after discharge.  If she experiences chest pain, shortness of breath, hemoptysis, neurological changes, high fever, pre syncope/syncope, trouble with catheter sites needs to be seen in the emergency dept.  RTC 4-6 weeks with Dr Wilhelm    Collaborating MD Wilhelm

## 2017-10-24 DIAGNOSIS — E78.49 OTHER HYPERLIPIDEMIA: ICD-10-CM

## 2017-10-24 RX ORDER — EZETIMIBE 10 MG/1
TABLET ORAL
Qty: 45 TAB | Refills: 3 | Status: SHIPPED | OUTPATIENT
Start: 2017-10-24 | End: 2018-02-15 | Stop reason: SDUPTHER

## 2017-10-30 ENCOUNTER — OFFICE VISIT (OUTPATIENT)
Dept: CARDIOLOGY | Facility: MEDICAL CENTER | Age: 72
End: 2017-10-30
Payer: MEDICARE

## 2017-10-30 VITALS
BODY MASS INDEX: 31.34 KG/M2 | HEART RATE: 61 BPM | WEIGHT: 195 LBS | DIASTOLIC BLOOD PRESSURE: 72 MMHG | HEIGHT: 66 IN | OXYGEN SATURATION: 94 % | SYSTOLIC BLOOD PRESSURE: 126 MMHG

## 2017-10-30 DIAGNOSIS — I48.0 PAROXYSMAL ATRIAL FIBRILLATION (HCC): ICD-10-CM

## 2017-10-30 DIAGNOSIS — Z98.890 S/P ABLATION OF ATRIAL FIBRILLATION: ICD-10-CM

## 2017-10-30 DIAGNOSIS — Z86.79 S/P ABLATION OF ATRIAL FLUTTER: ICD-10-CM

## 2017-10-30 DIAGNOSIS — Z95.0 PRESENCE OF PERMANENT CARDIAC PACEMAKER: Chronic | ICD-10-CM

## 2017-10-30 DIAGNOSIS — Z86.79 S/P ABLATION OF ATRIAL FIBRILLATION: ICD-10-CM

## 2017-10-30 DIAGNOSIS — Z98.890 S/P ABLATION OF ATRIAL FLUTTER: ICD-10-CM

## 2017-10-30 PROCEDURE — 93280 PM DEVICE PROGR EVAL DUAL: CPT | Performed by: INTERNAL MEDICINE

## 2017-10-30 PROCEDURE — 99214 OFFICE O/P EST MOD 30 MIN: CPT | Performed by: INTERNAL MEDICINE

## 2017-10-30 NOTE — PROGRESS NOTES
Subjective:   Jayna Lewis is a 72 y.o. female who presents today with recent PVI. No real recurrence post one week. On low dose Tambcor. Feels well. Old flutter RFA. No chest pain or SOB. PPM nl function.    Past Medical History:   Diagnosis Date   • Aneurysm of aortic root (CMS-HCC) 2011   • Atrial flutter (CMS-HCC) 2011   • Benign essential hypertension 2011   • Cancer (CMS-HCC) 1985    skin- basal cell   • Carotid artery stenosis 2011   • CATARACT     saadia IOL   • Dizziness 2011   • Encounter for long-term (current) use of other medications 2011   • History of depression 2011   • History of echocardiogram 2011   • History of myocardial perfusion scan 2011   • HLD (hyperlipidemia) 2011   • Infectious disease 2013    pt acquired infection post op and states she almost , pt unsure of what the infection was   • Long term (current) use of anticoagulants 2011   • Other specified disorder of intestines     constipation; IBS   • Pacemaker    • Paroxysmal atrial fibrillation (CMS-HCC) 2011   • Presence of permanent cardiac pacemaker 2011   • Psychiatric problem     depression   • Pulmonary embolism (CMS-HCC) 2011   • Sleep apnea 2011    pt no longer uses cpap, pt was told by md that she no longer had sleep apnea s/p weight loss   • Snoring     sleep study done   • SSS (sick sinus syndrome) (CMS-HCC) 2011   • Stroke (CMS-HCC)     TIA- suspected   • TIA (transient ischemic attack) 2011   • Unspecified urinary incontinence      Past Surgical History:   Procedure Laterality Date   • RECTOCELE REPAIR  2014    Performed by Tanvir Eden M.D. at [facility]   • BLADDER SLING FEMALE  2014    Performed by Tanvir Eden M.D. at [facility]   • CYSTOSCOPY  2014    Performed by Tanvir Eden M.D. at [facility]   • OTHER      pulmonary embolism   • OTHER  "CARDIAC SURGERY  2010    CATHETER ABLATION - ATRIAL FLUTTER.   • PACEMAKER INSERTION  2009   • HYSTERECTOMY, TOTAL ABDOMINAL     • OTHER      BLADDER SURGERY.   • TONSILLECTOMY       History reviewed. No pertinent family history.  History   Smoking Status   • Never Smoker   Smokeless Tobacco   • Never Used     Allergies   Allergen Reactions   • Levaquin Hives and Swelling     Hives, swelling in throat   • Macrobid [Nitrofurantoin Macrocrystal] Hives and Swelling     Hives, swelling in throat   • Multaq [Dronedarone] Hives and Swelling     Hives, swelling in throat   • Sotalol      \"dizzy\", feels off balance     Outpatient Encounter Prescriptions as of 10/30/2017   Medication Sig Dispense Refill   • ezetimibe (ZETIA) 10 MG Tab TAKE 0.5 TABLET BY MOUTH EVERY DAY 45 Tab 3   • metoprolol (LOPRESSOR) 25 MG Tab TAKE 1 TABLET BY MOUTH TWICE DAILY 60 Tab 11   • rivaroxaban (XARELTO) 20 MG Tab tablet Take 1 Tab by mouth with dinner. 90 Tab 3   • flecainide (TAMBOCOR) 50 MG tablet Take 1 Tab by mouth 2 times a day. May take additional dose of 50mg PO as needed for Atrial Fibrillation/Palpitations with total of 100mg PO BID. 180 Tab 3   • Cyanocobalamin (VITAMIN B 12 PO) Take  by mouth.     • Probiotic Product (PROBIOTIC DAILY PO) Take  by mouth.     • Biotin 5000 MCG Cap Take  by mouth.     • methenamine hip (HIPPREX) 1 GM TABS Take 1 g by mouth every day.     • Ascorbic Acid (VITAMIN C) 500 MG CAPS Take 500 mg by mouth every day.     • Omega-3 Fatty Acids (FISH OIL) 1200 MG CAPS Take 1,000 mg by mouth every day.     • Calcium Carbonate-Vit D-Min (CALCIUM 1200 PO) Take 1 Tab by mouth every day.     • conjugated estrogen (PREMARIN) 0.625 MG/GM CREA Insert 0.5 g in vagina See Admin Instructions. Monday PM  Friday AM     • Multiple Vitamin (MULTIVITAMIN PO) Take 1 Tab by mouth every day.     • Glucos-MSM-C-Lx-Kxxaej-Qhfabb (GLUCOSAMINE MSM COMPLEX) TABS Take 2 Tabs by mouth every day. 1200 mg     • sertraline (ZOLOFT) 50 MG Tab  " "    • omeprazole (PRILOSEC) 20 MG delayed-release capsule Take 1 Cap by mouth every morning before breakfast. 30 Cap 0     No facility-administered encounter medications on file as of 10/30/2017.      ROS     Objective:   /72   Pulse 61   Ht 1.676 m (5' 5.98\")   Wt 88.5 kg (195 lb)   LMP  (LMP Unknown)   SpO2 94%   BMI 31.49 kg/m²     Physical Exam   Constitutional: She is oriented to person, place, and time. She appears well-developed. No distress.   HENT:   Mouth/Throat: Mucous membranes are normal.   Eyes: Conjunctivae and EOM are normal.   Neck: No JVD present. No tracheal deviation present. No thyroid mass and no thyromegaly present.   Cardiovascular: Normal rate, regular rhythm and intact distal pulses.    No murmur heard.  Pulmonary/Chest: Effort normal and breath sounds normal. No respiratory distress. She exhibits no tenderness.   Abdominal: Soft. There is no tenderness.   Musculoskeletal: Normal range of motion. She exhibits no edema.   Neurological: She is alert and oriented to person, place, and time. She has normal strength. She displays no tremor.   Skin: Skin is warm and dry. She is not diaphoretic.   Psychiatric: She has a normal mood and affect. Her behavior is normal.   Vitals reviewed.      Assessment:     1. Presence of permanent cardiac pacemaker     2. Paroxysmal atrial fibrillation (CMS-HCC)     3. S/P ablation of atrial fibrillation     4. S/P ablation of atrial flutter         Medical Decision Making:  Today's Assessment / Status / Plan:   1. PAF post PVI continue Tambocor. [place] She wants to stay on 1C until then.  2. Anticoagulation continue.  3. PM nl function.  4. F/U in 4 months.  "

## 2017-12-26 DIAGNOSIS — I10 ESSENTIAL HYPERTENSION: ICD-10-CM

## 2017-12-29 DIAGNOSIS — Z98.890 S/P ABLATION OF ATRIAL FIBRILLATION: ICD-10-CM

## 2017-12-29 DIAGNOSIS — Z86.79 S/P ABLATION OF ATRIAL FIBRILLATION: ICD-10-CM

## 2018-01-31 ENCOUNTER — OFFICE VISIT (OUTPATIENT)
Dept: CARDIOLOGY | Facility: MEDICAL CENTER | Age: 73
End: 2018-01-31
Payer: MEDICARE

## 2018-01-31 ENCOUNTER — NON-PROVIDER VISIT (OUTPATIENT)
Dept: CARDIOLOGY | Facility: MEDICAL CENTER | Age: 73
End: 2018-01-31
Payer: MEDICARE

## 2018-01-31 VITALS
HEIGHT: 66 IN | SYSTOLIC BLOOD PRESSURE: 120 MMHG | DIASTOLIC BLOOD PRESSURE: 80 MMHG | WEIGHT: 192 LBS | HEART RATE: 62 BPM | BODY MASS INDEX: 30.86 KG/M2 | OXYGEN SATURATION: 94 %

## 2018-01-31 DIAGNOSIS — Z86.79 S/P ABLATION OF ATRIAL FLUTTER: ICD-10-CM

## 2018-01-31 DIAGNOSIS — I49.5 SICK SINUS SYNDROME (HCC): ICD-10-CM

## 2018-01-31 DIAGNOSIS — I48.0 PAROXYSMAL ATRIAL FIBRILLATION (HCC): ICD-10-CM

## 2018-01-31 DIAGNOSIS — Z95.0 PRESENCE OF PERMANENT CARDIAC PACEMAKER: Chronic | ICD-10-CM

## 2018-01-31 DIAGNOSIS — Z86.79 S/P ABLATION OF ATRIAL FIBRILLATION: ICD-10-CM

## 2018-01-31 DIAGNOSIS — I71.21 ANEURYSM OF AORTIC ROOT (HCC): Chronic | ICD-10-CM

## 2018-01-31 DIAGNOSIS — Z98.890 S/P ABLATION OF ATRIAL FIBRILLATION: ICD-10-CM

## 2018-01-31 DIAGNOSIS — G45.8 OTHER SPECIFIED TRANSIENT CEREBRAL ISCHEMIAS: Chronic | ICD-10-CM

## 2018-01-31 DIAGNOSIS — E78.5 DYSLIPIDEMIA: ICD-10-CM

## 2018-01-31 DIAGNOSIS — Z98.890 S/P ABLATION OF ATRIAL FLUTTER: ICD-10-CM

## 2018-01-31 DIAGNOSIS — I48.4 ATYPICAL ATRIAL FLUTTER (HCC): Chronic | ICD-10-CM

## 2018-01-31 DIAGNOSIS — I10 BENIGN ESSENTIAL HYPERTENSION: Chronic | ICD-10-CM

## 2018-01-31 DIAGNOSIS — Z79.01 LONG TERM CURRENT USE OF ANTICOAGULANT THERAPY: Chronic | ICD-10-CM

## 2018-01-31 DIAGNOSIS — I65.23 BILATERAL CAROTID ARTERY STENOSIS: Chronic | ICD-10-CM

## 2018-01-31 PROCEDURE — 99214 OFFICE O/P EST MOD 30 MIN: CPT | Performed by: INTERNAL MEDICINE

## 2018-01-31 PROCEDURE — 93280 PM DEVICE PROGR EVAL DUAL: CPT | Performed by: INTERNAL MEDICINE

## 2018-01-31 ASSESSMENT — ENCOUNTER SYMPTOMS
NERVOUS/ANXIOUS: 0
INSOMNIA: 1
MYALGIAS: 0
ABDOMINAL PAIN: 0
WEIGHT LOSS: 0
PND: 0
FEVER: 0
BLOOD IN STOOL: 0
LOSS OF CONSCIOUSNESS: 0
CHILLS: 0
PALPITATIONS: 0
BLURRED VISION: 0
ORTHOPNEA: 0
DEPRESSION: 1
DIZZINESS: 0
SHORTNESS OF BREATH: 0
FLANK PAIN: 0

## 2018-02-01 NOTE — PROGRESS NOTES
"Subjective:   Jayna Lewis is a 72 y.o. female who presents for annual follow up of ascending aortic aneurysm and study result review.    HPI    Since the patient's last visit on 05/11/17, she has been doing well clinically from cardiac standpoint. She denies chest pain, shortness of breath, palpitations, nausea/vomiting or diaphoresis. She has been suffering with insomnia and depression and has been started on medication.    Review of Systems   Constitutional: Negative for chills, fever, malaise/fatigue and weight loss.        Weight gain.   HENT: Negative for congestion.    Eyes: Negative for blurred vision.   Respiratory: Negative for shortness of breath.    Cardiovascular: Negative for chest pain, palpitations, orthopnea, leg swelling and PND.   Gastrointestinal: Negative for abdominal pain, blood in stool and melena.   Genitourinary: Negative for dysuria, flank pain, frequency, hematuria and urgency.   Musculoskeletal: Negative for joint pain and myalgias.   Skin: Negative for rash.   Neurological: Negative for dizziness and loss of consciousness.   Psychiatric/Behavioral: Positive for depression. The patient has insomnia. The patient is not nervous/anxious.       Objective:     /80   Pulse 62   Ht 1.676 m (5' 6\")   Wt 87.1 kg (192 lb)   LMP  (LMP Unknown)   SpO2 94%   BMI 30.99 kg/m²     Physical Exam   Constitutional: She is oriented to person, place, and time. She appears well-developed and well-nourished.   HENT:   Head: Normocephalic and atraumatic.   Eyes: Conjunctivae are normal. Pupils are equal, round, and reactive to light.   Neck: Normal range of motion. Neck supple.   Cardiovascular: Normal rate and regular rhythm.    Pulmonary/Chest: Effort normal and breath sounds normal.   Abdominal: Soft. Bowel sounds are normal. She exhibits no distension and no mass. There is no tenderness. There is no rebound and no guarding.   Musculoskeletal: Normal range of motion. She exhibits no edema. "   Neurological: She is alert and oriented to person, place, and time.   Skin: Skin is warm and dry.   Psychiatric: She has a normal mood and affect.     Medications reviewed.    Current Outpatient Prescriptions   Medication   • rivaroxaban (XARELTO) 20 MG Tab tablet   • metoprolol (LOPRESSOR) 25 MG Tab   • ezetimibe (ZETIA) 10 MG Tab   • flecainide (TAMBOCOR) 50 MG tablet   • Cyanocobalamin (VITAMIN B 12 PO)   • Probiotic Product (PROBIOTIC DAILY PO)   • methenamine hip (HIPPREX) 1 GM TABS   • Ascorbic Acid (VITAMIN C) 500 MG CAPS   • Omega-3 Fatty Acids (FISH OIL) 1200 MG CAPS   • Calcium Carbonate-Vit D-Min (CALCIUM 1200 PO)   • conjugated estrogen (PREMARIN) 0.625 MG/GM CREA   • Multiple Vitamin (MULTIVITAMIN PO)   • Glucos-MSM-C-Nh-Tracfs-Ubnjgh (GLUCOSAMINE MSM COMPLEX) TABS     No current facility-administered medications for this visit.      CARDIAC STUDIES/PROCEDURES:    CAROTID ULTRASOUND (05/14/14)  Very mild plaque seen in carotid artery bilaterally.  Normal vertebral flow.    CAROTID ULTRASOUND (07/14/09)  Carotid ultrasound showing mild stenosis.    CT OF CHEST (04/15/15)  1. Some fusiform dilatation of the ascending thoracic aorta is noted with maximum axial dimensions of 4.7 x 4.2 cm. There is no evidence of saccular aneurysm and there is no evidence of dissection.  2. No significant atherosclerotic plaque either calcified or noncalcified is visible in the aorta.  3. Main branch vessels of the aorta in the chest and abdomen appear patent.  4. Cholelithiasis.  5. Small area of arterioportal shunting appears to be present laterally in the right lobe of the liver.  6. Linear opacifications in each lung base are likely due to discoid atelectasis or scarring.    ECHOCARDIOGRAM CONCLUSIONS (05/02/17)  Prior echocardiogram 4/25/2016. Compared to the report of the study   done - there has been no significant change.   Normal left ventricular systolic function.  Left ventricular ejection fraction is visually  estimated to be 65%.  Grade I diastolic dysfunction.  Pacer/ICD wire seen in right ventricle.  Mild mitral regurgitation.  Mild tricuspid regurgitation.  Estimated right ventricular systolic pressure is 25 mmHg.  Ascending aorta is dilated with a diameter of 4.4 cm.    ECHOCARDIOGRAM CONCLUSIONS (04/25/16)  Compared to the report of the study done 09/14/15- there has been no   significant change.   Normal left ventricular systolic function.  Left ventricular ejection fraction is visually estimated to be 65%.  Grade I diastolic dysfunction.  Pacer/ICD wire seen in right ventricle.  Mild mitral regurgitation.  Mild tricuspid regurgitation.  Right ventricular systolic pressure is estimated to be 25 mmHg.  Mild pulmonic insufficiency.  Ascending aorta is dilated with a diameter of 4.3 cm.    ECHOCARDIOGRAM CONCLUSIONS (09/14/15)  Compared to prior study of 01/07/2015; no significant changes were noted.  Normal left ventricular systolic function.  Left ventricular ejection fraction is visually estimated to be 65%.  Grade I diastolic dysfunction.   Pacer/ICD wire seen in right ventricle.  Mild tricuspid regurgitation.  Right ventricular systolic pressure is estimated to be 30 mmHg.  Mild to moderate pulmonic insufficiency.  Ascending aorta dilatation, 4.3 cm.    ECHOCARDIOGRAM CONCLUSIONS (01/07/15)  Normal left ventricular systolic function.  Mild concentric left ventricular hypertrophy.  Grade II diastolic dysfunction - pseudonormal mitral inflow is observed.  Pacer wire seen in right ventricle.  Catheter/pacemaker wire present in the right atrial cavity.  Mildly dilated left atrium.  Mitral annular calcification.  Mild mitral regurgitation.  Aortic sclerosis without stenosis.  Mild tricuspid regurgitation.  Trace pulmonic insufficiency.  Aortic root, 3.5 cm. Dilated ascending aorta, 4.5 cm.    ECHOCARDIOGRAM CONCLUSIONS (06/25/13)  Normal left ventricular systolic function.   Left ventricular ejection fraction is 65%  to 70%.   Mild mitral regurgitation.  Moderate pulmonic insufficiency.  Ascending aorta measuring 3.5 cm.    ECHOCARDIOGRAM CONCLUSIONS (07/27/11)  Echocardiogram showing ejection fraction of 70% with mild tricuspid regurgitation and mild aortic root dilation.    EKG performed on (02/20/14) EKG shows sinus tachycardia with diffuse ST segment depressions.  EKG performed on (02/08/14) EKG shows atrial fibrillation.    Laboratory results of (05/09/16) Cholesterol profile of 150/97/60/71 noted.  Laboratory results of (10/30/15) Cholesterol profile of 195/92/62/115 noted.  Laboratory results of (01/07/15) Cholesterol profile of 182/62/66/104 noted.  Laboratory results of (01/03/12) Protein C and S levels were low.    MYOCARDIAL PERFUSION STUDY CONCLUSIONS (05/23/17)  No evidence of significant jeopardized viable myocardium or prior myocardial infarction.  Normal left ventricular wall motion.  LV ejection fraction = 70%.  ECG INTERPRETATION  Negative stress ECG for ischemia.    MPI CONCLUSION (10/06/06)  Unremarkable MPI.     Assessment:     Patient Active Problem List   Diagnoses Date Noted   • Aneurysm of aortic root [441.9AZ] 09/27/2011     Priority: Medium   • SSS (sick sinus syndrome) [427.81AE]  Atrial flutter [427.32]   Paroxysmal atrial fibrillation [427.31K] 09/27/2011     Priority: Medium   • Presence of permanent cardiac pacemaker [V45.01AR] 09/27/2011     Priority: Medium   • Encounter for long-term (current) use of anticoagulants [V58.61] 08/04/2011     Priority: Medium   • Hypertension [401.1B] 09/27/2011     Priority: Low   • Hyperlipidemia [272.4AY] 09/27/2011     Priority: Low   • Carotid artery stenosis [433.10A] 09/27/2011   • TIA (transient ischemic attack) [435.9E] 09/27/2011   • Pulmonary embolism [415.19AD] 08/04/2011     Plan:     1. Aortic root dilation: Clinically stable on medical therapy. We will repeat an echocardiogram in one year.  2. Atrial fibrillation/flutter/sick sinus syndrome with  ablation on 09/05/17, pacemaker placement on anticoagulation therapy (Xarelto) and intolerance sotalol (managed by Janes Ron). She is clinically doing well from flecainde.   3. Hypertension: Blood pressure is well controlled.  4. Hyperlipidemia with intolerance to statin therapy: Stable on [place] 3 FA and Zetia therapy. We will repeat labs including fasting lipid profile.  5. Carotid artery stenosis: Mild disease which is stable on medical therapy.   6. History of trans-ischemic attack: She remains clinically stable without any new neurological symptoms.  7. History of pulmonary embolism: She is clinically doing well without recurrence or chest pain or shortness of breath.  8. Health maintenance: Vaginal hematoma, status post rectocele repair and mid-urethral sling procedure managed by Dr. Eden: Recovered.  9. Additional information: She is under stress due lost of contact with all of her children.    We will follow up the patient in one year.    CC Vu Retana

## 2018-02-03 LAB
ALBUMIN SERPL-MCNC: 4.2 G/DL (ref 3.5–4.8)
ALBUMIN/GLOB SERPL: 1.6 {RATIO} (ref 1.2–2.2)
ALP SERPL-CCNC: 93 IU/L (ref 39–117)
ALT SERPL-CCNC: 18 IU/L (ref 0–32)
AST SERPL-CCNC: 19 IU/L (ref 0–40)
BILIRUB SERPL-MCNC: 0.5 MG/DL (ref 0–1.2)
BUN SERPL-MCNC: 15 MG/DL (ref 8–27)
BUN/CREAT SERPL: 19 (ref 12–28)
CALCIUM SERPL-MCNC: 9.1 MG/DL (ref 8.7–10.3)
CHLORIDE SERPL-SCNC: 103 MMOL/L (ref 96–106)
CHOLEST SERPL-MCNC: 183 MG/DL (ref 100–199)
CO2 SERPL-SCNC: 25 MMOL/L (ref 18–29)
COMMENT 011824: ABNORMAL
CREAT SERPL-MCNC: 0.81 MG/DL (ref 0.57–1)
GFR SERPLBLD CREATININE-BSD FMLA CKD-EPI: 72 ML/MIN/1.73
GFR SERPLBLD CREATININE-BSD FMLA CKD-EPI: 83 ML/MIN/1.73
GLOBULIN SER CALC-MCNC: 2.6 G/DL (ref 1.5–4.5)
GLUCOSE SERPL-MCNC: 91 MG/DL (ref 65–99)
HDLC SERPL-MCNC: 51 MG/DL
LDLC SERPL CALC-MCNC: 112 MG/DL (ref 0–99)
POTASSIUM SERPL-SCNC: 3.9 MMOL/L (ref 3.5–5.2)
PROT SERPL-MCNC: 6.8 G/DL (ref 6–8.5)
SODIUM SERPL-SCNC: 143 MMOL/L (ref 134–144)
TRIGL SERPL-MCNC: 98 MG/DL (ref 0–149)
VLDLC SERPL CALC-MCNC: 20 MG/DL (ref 5–40)

## 2018-02-06 ENCOUNTER — TELEPHONE (OUTPATIENT)
Dept: CARDIOLOGY | Facility: MEDICAL CENTER | Age: 73
End: 2018-02-06

## 2018-02-06 DIAGNOSIS — E78.49 OTHER HYPERLIPIDEMIA: ICD-10-CM

## 2018-02-06 NOTE — TELEPHONE ENCOUNTER
Pt called with message. No answer, left VM for call back.  Mariza MAE RN     ----- Message from CESAR Doan sent at 2/5/2018 10:35 AM PST -----  Lipid panel shows LDL at 112, no CAD just carotid disease. Have her watch her diet a little more, no need for increase in HLD med management. CMP looks good. SC

## 2018-02-09 ENCOUNTER — HOSPITAL ENCOUNTER (OUTPATIENT)
Dept: RADIOLOGY | Facility: MEDICAL CENTER | Age: 73
End: 2018-02-09
Attending: INTERNAL MEDICINE
Payer: MEDICARE

## 2018-02-09 DIAGNOSIS — I71.21 ANEURYSM OF AORTIC ROOT (HCC): Chronic | ICD-10-CM

## 2018-02-09 PROCEDURE — 71275 CT ANGIOGRAPHY CHEST: CPT

## 2018-02-12 ENCOUNTER — TELEPHONE (OUTPATIENT)
Dept: CARDIOLOGY | Facility: MEDICAL CENTER | Age: 73
End: 2018-02-12

## 2018-02-13 NOTE — TELEPHONE ENCOUNTER
mychart message sent. Also [facility] message sent about lab work.     ----------------------------------------------------------------------  ----- Message from CESAR Doan sent at 2/10/2018  1:50 PM PST -----  AA at 4.1 cm. Stable, surveillance every year if chronic. If new, repeat in 6 months. SC

## 2018-02-14 NOTE — TELEPHONE ENCOUNTER
Per weendyt messages, pt would like to try increasing her Zetia 10mg from 0.5 tabs daily to 1 full tablet daily.     To SC: okay to increase prescription?

## 2018-02-15 RX ORDER — EZETIMIBE 10 MG/1
10 TABLET ORAL DAILY
Qty: 90 TAB | Refills: 3 | OUTPATIENT
Start: 2018-02-15 | End: 2019-02-19 | Stop reason: SDUPTHER

## 2018-04-24 ENCOUNTER — HOSPITAL ENCOUNTER (OUTPATIENT)
Dept: CARDIOLOGY | Facility: MEDICAL CENTER | Age: 73
End: 2018-04-24
Attending: INTERNAL MEDICINE
Payer: MEDICARE

## 2018-04-24 DIAGNOSIS — I48.92 ATRIAL FLUTTER, UNSPECIFIED TYPE (HCC): Chronic | ICD-10-CM

## 2018-04-24 DIAGNOSIS — I48.0 PAROXYSMAL ATRIAL FIBRILLATION (HCC): Chronic | ICD-10-CM

## 2018-04-24 DIAGNOSIS — I71.21 ANEURYSM OF AORTIC ROOT (HCC): Chronic | ICD-10-CM

## 2018-04-24 PROCEDURE — 93306 TTE W/DOPPLER COMPLETE: CPT

## 2018-04-25 LAB
LV EJECT FRACT  99904: 65
LV EJECT FRACT MOD 2C 99903: 83.17
LV EJECT FRACT MOD 4C 99902: 65.87
LV EJECT FRACT MOD BP 99901: 75.95

## 2018-04-30 ENCOUNTER — TELEPHONE (OUTPATIENT)
Dept: CARDIOLOGY | Facility: MEDICAL CENTER | Age: 73
End: 2018-04-30

## 2018-04-30 NOTE — TELEPHONE ENCOUNTER
"----- Message from Kyleigh Jovel sent at 4/30/2018 12:24 PM PDT -----  Regarding: echo results  Contact: [phone]  KAYLIN/jb    Pt calling to discuss echo results from 4/24, and to let you know about some \"feelings\" she has in her chest area which are more frequent and more pronounced since her ablation & KAILEE.  Please call Jayna at  or on [phone]  "

## 2018-04-30 NOTE — TELEPHONE ENCOUNTER
"S/w pt, states she has worsening sob over the last couple of months.  Pt states she is experiencing sob at rest and with exertion.  Pt reports only be able to do about a 1/3 of what she can usually do on a walk and at times she will experience chest tightness along with the sob.  Pt educated on evaluation in ER with these symptoms.  Pt states she only thought she should go to ER if she had chest \"pain\".  Educated pt on chest tightness and how that could be an indication of heart issues as well.  Pt v/u.  Pt also states at night in bed she feels a wave that goes across her chest from right to left which feels like a heavy sadness, which she does deep breathing to alleviate, but doesn't seem to help.  Pt also reports sitting in the chair relaxing and feels like she is falling through the chair with extreme tiredness.  Pt scheduled to see JI 5/8/18, however pt strongly encouraged to be evaluated in ER if has another episode of sob with chest tightness.  Pt v/u and thankful for call.  "

## 2018-05-07 ENCOUNTER — HOSPITAL ENCOUNTER (OUTPATIENT)
Facility: MEDICAL CENTER | Age: 73
End: 2018-05-07
Attending: EMERGENCY MEDICINE | Admitting: HOSPITALIST
Payer: MEDICARE

## 2018-05-07 ENCOUNTER — APPOINTMENT (OUTPATIENT)
Dept: RADIOLOGY | Facility: MEDICAL CENTER | Age: 73
End: 2018-05-07
Attending: EMERGENCY MEDICINE
Payer: MEDICARE

## 2018-05-07 ENCOUNTER — APPOINTMENT (OUTPATIENT)
Dept: RADIOLOGY | Facility: MEDICAL CENTER | Age: 73
End: 2018-05-07
Attending: HOSPITALIST
Payer: MEDICARE

## 2018-05-07 VITALS
BODY MASS INDEX: 31.71 KG/M2 | DIASTOLIC BLOOD PRESSURE: 85 MMHG | WEIGHT: 197.31 LBS | RESPIRATION RATE: 16 BRPM | OXYGEN SATURATION: 96 % | SYSTOLIC BLOOD PRESSURE: 153 MMHG | TEMPERATURE: 97.8 F | HEIGHT: 66 IN | HEART RATE: 61 BPM

## 2018-05-07 DIAGNOSIS — R07.9 CHEST PAIN, UNSPECIFIED TYPE: ICD-10-CM

## 2018-05-07 PROBLEM — R07.89 CHEST PAIN, ATYPICAL: Status: ACTIVE | Noted: 2018-05-07

## 2018-05-07 LAB
ALBUMIN SERPL BCP-MCNC: 3.5 G/DL (ref 3.2–4.9)
ALBUMIN/GLOB SERPL: 1.2 G/DL
ALP SERPL-CCNC: 76 U/L (ref 30–99)
ALT SERPL-CCNC: 18 U/L (ref 2–50)
ANION GAP SERPL CALC-SCNC: 8 MMOL/L (ref 0–11.9)
APTT PPP: 33.2 SEC (ref 24.7–36)
AST SERPL-CCNC: 19 U/L (ref 12–45)
BASOPHILS # BLD AUTO: 0.6 % (ref 0–1.8)
BASOPHILS # BLD: 0.03 K/UL (ref 0–0.12)
BILIRUB SERPL-MCNC: 0.9 MG/DL (ref 0.1–1.5)
BNP SERPL-MCNC: 87 PG/ML (ref 0–100)
BUN SERPL-MCNC: 17 MG/DL (ref 8–22)
CALCIUM SERPL-MCNC: 9.6 MG/DL (ref 8.4–10.2)
CHLORIDE SERPL-SCNC: 108 MMOL/L (ref 96–112)
CO2 SERPL-SCNC: 24 MMOL/L (ref 20–33)
CREAT SERPL-MCNC: 0.77 MG/DL (ref 0.5–1.4)
EKG IMPRESSION: NORMAL
EOSINOPHIL # BLD AUTO: 0.27 K/UL (ref 0–0.51)
EOSINOPHIL NFR BLD: 5.6 % (ref 0–6.9)
ERYTHROCYTE [DISTWIDTH] IN BLOOD BY AUTOMATED COUNT: 44.3 FL (ref 35.9–50)
GLOBULIN SER CALC-MCNC: 3 G/DL (ref 1.9–3.5)
GLUCOSE SERPL-MCNC: 102 MG/DL (ref 65–99)
HCT VFR BLD AUTO: 43.3 % (ref 37–47)
HGB BLD-MCNC: 15.3 G/DL (ref 12–16)
IMM GRANULOCYTES # BLD AUTO: 0.01 K/UL (ref 0–0.11)
IMM GRANULOCYTES NFR BLD AUTO: 0.2 % (ref 0–0.9)
INR PPP: 1.19 (ref 0.87–1.13)
LIPASE SERPL-CCNC: 38 U/L (ref 11–82)
LYMPHOCYTES # BLD AUTO: 1.94 K/UL (ref 1–4.8)
LYMPHOCYTES NFR BLD: 40.1 % (ref 22–41)
MCH RBC QN AUTO: 32.8 PG (ref 27–33)
MCHC RBC AUTO-ENTMCNC: 35.3 G/DL (ref 33.6–35)
MCV RBC AUTO: 92.7 FL (ref 81.4–97.8)
MONOCYTES # BLD AUTO: 0.54 K/UL (ref 0–0.85)
MONOCYTES NFR BLD AUTO: 11.2 % (ref 0–13.4)
NEUTROPHILS # BLD AUTO: 2.05 K/UL (ref 2–7.15)
NEUTROPHILS NFR BLD: 42.3 % (ref 44–72)
NRBC # BLD AUTO: 0 K/UL
NRBC BLD-RTO: 0 /100 WBC
PLATELET # BLD AUTO: 203 K/UL (ref 164–446)
PMV BLD AUTO: 9.1 FL (ref 9–12.9)
POTASSIUM SERPL-SCNC: 3.8 MMOL/L (ref 3.6–5.5)
PROT SERPL-MCNC: 6.5 G/DL (ref 6–8.2)
PROTHROMBIN TIME: 15 SEC (ref 12–14.6)
RBC # BLD AUTO: 4.67 M/UL (ref 4.2–5.4)
SODIUM SERPL-SCNC: 140 MMOL/L (ref 135–145)
TROPONIN I SERPL-MCNC: <0.02 NG/ML (ref 0–0.04)
TROPONIN I SERPL-MCNC: <0.02 NG/ML (ref 0–0.04)
WBC # BLD AUTO: 4.8 K/UL (ref 4.8–10.8)

## 2018-05-07 PROCEDURE — 83690 ASSAY OF LIPASE: CPT

## 2018-05-07 PROCEDURE — 99285 EMERGENCY DEPT VISIT HI MDM: CPT

## 2018-05-07 PROCEDURE — 80053 COMPREHEN METABOLIC PANEL: CPT

## 2018-05-07 PROCEDURE — 85025 COMPLETE CBC W/AUTO DIFF WBC: CPT

## 2018-05-07 PROCEDURE — 71045 X-RAY EXAM CHEST 1 VIEW: CPT

## 2018-05-07 PROCEDURE — 84484 ASSAY OF TROPONIN QUANT: CPT

## 2018-05-07 PROCEDURE — G0378 HOSPITAL OBSERVATION PER HR: HCPCS

## 2018-05-07 PROCEDURE — 93005 ELECTROCARDIOGRAM TRACING: CPT | Mod: XU | Performed by: EMERGENCY MEDICINE

## 2018-05-07 PROCEDURE — 700111 HCHG RX REV CODE 636 W/ 250 OVERRIDE (IP)

## 2018-05-07 PROCEDURE — 85610 PROTHROMBIN TIME: CPT

## 2018-05-07 PROCEDURE — 83880 ASSAY OF NATRIURETIC PEPTIDE: CPT

## 2018-05-07 PROCEDURE — 85730 THROMBOPLASTIN TIME PARTIAL: CPT

## 2018-05-07 PROCEDURE — 36415 COLL VENOUS BLD VENIPUNCTURE: CPT

## 2018-05-07 PROCEDURE — 99235 HOSP IP/OBS SAME DATE MOD 70: CPT | Performed by: HOSPITALIST

## 2018-05-07 PROCEDURE — A9502 TC99M TETROFOSMIN: HCPCS

## 2018-05-07 RX ORDER — GLUCOSAM/MSM/VIT C/MANG/HERB21
2 TABLET ORAL DAILY
Status: DISCONTINUED | OUTPATIENT
Start: 2018-05-07 | End: 2018-05-07

## 2018-05-07 RX ORDER — ACETAMINOPHEN 325 MG/1
650 TABLET ORAL EVERY 6 HOURS PRN
Status: DISCONTINUED | OUTPATIENT
Start: 2018-05-07 | End: 2018-05-07 | Stop reason: HOSPADM

## 2018-05-07 RX ORDER — FLECAINIDE ACETATE 50 MG/1
50 TABLET ORAL 2 TIMES DAILY
Status: DISCONTINUED | OUTPATIENT
Start: 2018-05-07 | End: 2018-05-07 | Stop reason: HOSPADM

## 2018-05-07 RX ORDER — ASPIRIN 600 MG/1
300 SUPPOSITORY RECTAL DAILY
Status: DISCONTINUED | OUTPATIENT
Start: 2018-05-07 | End: 2018-05-07 | Stop reason: HOSPADM

## 2018-05-07 RX ORDER — ONDANSETRON 4 MG/1
4 TABLET, ORALLY DISINTEGRATING ORAL EVERY 4 HOURS PRN
Status: DISCONTINUED | OUTPATIENT
Start: 2018-05-07 | End: 2018-05-07 | Stop reason: HOSPADM

## 2018-05-07 RX ORDER — ACETAMINOPHEN 500 MG
1 TABLET ORAL DAILY
Status: DISCONTINUED | OUTPATIENT
Start: 2018-05-07 | End: 2018-05-07

## 2018-05-07 RX ORDER — LABETALOL HYDROCHLORIDE 5 MG/ML
10 INJECTION, SOLUTION INTRAVENOUS EVERY 4 HOURS PRN
Status: DISCONTINUED | OUTPATIENT
Start: 2018-05-07 | End: 2018-05-07 | Stop reason: HOSPADM

## 2018-05-07 RX ORDER — BISACODYL 10 MG
10 SUPPOSITORY, RECTAL RECTAL
Status: DISCONTINUED | OUTPATIENT
Start: 2018-05-07 | End: 2018-05-07 | Stop reason: HOSPADM

## 2018-05-07 RX ORDER — CHOLECALCIFEROL (VITAMIN D3) 125 MCG
1000 CAPSULE ORAL DAILY
Status: DISCONTINUED | OUTPATIENT
Start: 2018-05-07 | End: 2018-05-07 | Stop reason: HOSPADM

## 2018-05-07 RX ORDER — MULTIVIT WITH MINERALS/LUTEIN
1 TABLET ORAL EVERY MORNING
COMMUNITY

## 2018-05-07 RX ORDER — MECLIZINE HCL 12.5 MG/1
12.5 TABLET ORAL 3 TIMES DAILY PRN
Qty: 30 TAB | Refills: 0 | Status: SHIPPED | OUTPATIENT
Start: 2018-05-07

## 2018-05-07 RX ORDER — ASCORBIC ACID
500 CRYSTALS ORAL DAILY
Status: DISCONTINUED | OUTPATIENT
Start: 2018-05-07 | End: 2018-05-07

## 2018-05-07 RX ORDER — AMOXICILLIN 250 MG
2 CAPSULE ORAL 2 TIMES DAILY
Status: DISCONTINUED | OUTPATIENT
Start: 2018-05-07 | End: 2018-05-07 | Stop reason: HOSPADM

## 2018-05-07 RX ORDER — METHENAMINE HIPPURATE 1000 MG/1
1 TABLET ORAL DAILY
Status: DISCONTINUED | OUTPATIENT
Start: 2018-05-08 | End: 2018-05-07 | Stop reason: HOSPADM

## 2018-05-07 RX ORDER — ONDANSETRON 2 MG/ML
4 INJECTION INTRAMUSCULAR; INTRAVENOUS EVERY 4 HOURS PRN
Status: DISCONTINUED | OUTPATIENT
Start: 2018-05-07 | End: 2018-05-07 | Stop reason: HOSPADM

## 2018-05-07 RX ORDER — ASPIRIN 81 MG/1
324 TABLET, CHEWABLE ORAL DAILY
Status: DISCONTINUED | OUTPATIENT
Start: 2018-05-07 | End: 2018-05-07 | Stop reason: HOSPADM

## 2018-05-07 RX ORDER — ASPIRIN 325 MG
325 TABLET ORAL DAILY
Status: DISCONTINUED | OUTPATIENT
Start: 2018-05-07 | End: 2018-05-07 | Stop reason: HOSPADM

## 2018-05-07 RX ORDER — ASCORBIC ACID 500 MG
500 TABLET ORAL DAILY
Status: DISCONTINUED | OUTPATIENT
Start: 2018-05-07 | End: 2018-05-07 | Stop reason: HOSPADM

## 2018-05-07 RX ORDER — EZETIMIBE 10 MG/1
10 TABLET ORAL DAILY
Status: DISCONTINUED | OUTPATIENT
Start: 2018-05-08 | End: 2018-05-07 | Stop reason: HOSPADM

## 2018-05-07 RX ORDER — UBIDECARENONE 75 MG
100 CAPSULE ORAL DAILY
COMMUNITY

## 2018-05-07 RX ORDER — REGADENOSON 0.08 MG/ML
INJECTION, SOLUTION INTRAVENOUS
Status: COMPLETED
Start: 2018-05-07 | End: 2018-05-07

## 2018-05-07 RX ORDER — CHLORAL HYDRATE 500 MG
1000 CAPSULE ORAL DAILY
Status: DISCONTINUED | OUTPATIENT
Start: 2018-05-08 | End: 2018-05-07 | Stop reason: HOSPADM

## 2018-05-07 RX ORDER — POLYETHYLENE GLYCOL 3350 17 G/17G
1 POWDER, FOR SOLUTION ORAL
Status: DISCONTINUED | OUTPATIENT
Start: 2018-05-07 | End: 2018-05-07 | Stop reason: HOSPADM

## 2018-05-07 RX ADMIN — REGADENOSON 0.4 MG: 0.08 INJECTION, SOLUTION INTRAVENOUS at 15:15

## 2018-05-07 ASSESSMENT — CHA2DS2 SCORE
SEX: FEMALE
CHF OR LEFT VENTRICULAR DYSFUNCTION: NO
DIABETES: NO
PRIOR STROKE OR TIA OR THROMBOEMBOLISM: YES
CHA2DS2 VASC SCORE: 6
AGE 65 TO 74: YES
AGE 75 OR GREATER: NO
HYPERTENSION: YES
VASCULAR DISEASE: YES

## 2018-05-07 ASSESSMENT — ENCOUNTER SYMPTOMS
BACK PAIN: 0
CHILLS: 0
COUGH: 0
PALPITATIONS: 0
HEADACHES: 0
NAUSEA: 0
INSOMNIA: 0
TINGLING: 0
NECK PAIN: 0
SORE THROAT: 0
ABDOMINAL PAIN: 0
BLURRED VISION: 0
SHORTNESS OF BREATH: 0
FEVER: 0
VOMITING: 0
DEPRESSION: 0
EYE PAIN: 0
DIZZINESS: 0

## 2018-05-07 ASSESSMENT — LIFESTYLE VARIABLES: EVER_SMOKED: NEVER

## 2018-05-07 NOTE — H&P
" Hospital Medicine History and Physical    Date of Service  2018    Chief Complaint  Chief Complaint   Patient presents with   • Shortness of Breath     c/o dyspnea on exertion       History of Presenting Illness  72 y.o. female who presented 2018 with chest pressure. She has been having \"spells\" like this for several years now and is followed by cardiology for this among other things. She was to see Dr Llanes about this tomorrow but was at dinner with her PCP who asked that she present to the ED. Her episodes consist of a heaviness in her chest, mild dizziness and a gray tone to her vision. She has not had syncope, she does feel associated fatigue and dyspnea. She will feel worse with exertion during an episode but exertion will not bring them on. Meditation will sometimes relieve her symptoms. She has not been ill otherwise recently. She has a history of afib with an ablation last september. She says these episodes are not like her symptoms of afib.         Primary Care Physician  Vu Vila M.D.    Consultants  none    Code Status  full    Review of Systems  Review of Systems   Constitutional: Negative for chills and fever.   HENT: Negative for sore throat.    Eyes: Negative for blurred vision and pain.   Respiratory: Negative for cough and shortness of breath.    Cardiovascular: Negative for chest pain and palpitations.   Gastrointestinal: Negative for abdominal pain, nausea and vomiting.   Genitourinary: Negative for dysuria and urgency.   Musculoskeletal: Negative for back pain and neck pain.   Skin: Negative for itching and rash.   Neurological: Negative for dizziness, tingling and headaches.   Psychiatric/Behavioral: Negative for depression. The patient does not have insomnia.    All other systems reviewed and are negative.       Past Medical History  Past Medical History:   Diagnosis Date   • Infectious disease 2013    pt acquired infection post op and states she almost , pt unsure of " what the infection was   • Aneurysm of aortic root (HCC) 9/27/2011   • Benign essential hypertension 9/27/2011   • Carotid artery stenosis 9/27/2011   • Dizziness 9/27/2011   • History of echocardiogram 9/27/2011   • HLD (hyperlipidemia) 9/27/2011   • History of myocardial perfusion scan 9/27/2011   • Presence of permanent cardiac pacemaker 9/27/2011   • TIA (transient ischemic attack) 9/27/2011   • History of depression 9/27/2011   • Atrial flutter (HCC) 9/27/2011   • Paroxysmal atrial fibrillation (HCC) 9/27/2011   • Sleep apnea 9/27/2011    pt no longer uses cpap, pt was told by md that she no longer had sleep apnea s/p weight loss   • Long term (current) use of anticoagulants 8/4/2011   • Pulmonary embolism (HCC) 8/4/2011   • Encounter for long-term (current) use of other medications 7/22/2011   • SSS (sick sinus syndrome) ([facility]) 7/22/2011   • Stroke ([facility]) 2009    TIA- suspected   • Cancer ([facility]) 1985    skin- basal cell   • CATARACT     saadia IOL   • Other specified disorder of intestines     constipation; IBS   • Pacemaker    • Psychiatric problem     depression   • Snoring     sleep study done   • Unspecified urinary incontinence        Surgical History  Past Surgical History:   Procedure Laterality Date   • RECTOCELE REPAIR  2/4/2014    Performed by Tanvir Eden M.D. at SURGERY SAME DAY [facility] ORS   • BLADDER SLING FEMALE  2/4/2014    Performed by Tanvir Eden M.D. at SURGERY SAME DAY [facility] ORS   • CYSTOSCOPY  2/4/2014    Performed by Tanvir Eden M.D. at SURGERY SAME DAY [facility] ORS   • OTHER  2011    pulmonary embolism   • OTHER CARDIAC SURGERY  2010    CATHETER ABLATION - ATRIAL FLUTTER.   • PACEMAKER INSERTION  2009   • HYSTERECTOMY, TOTAL ABDOMINAL     • OTHER      BLADDER SURGERY.   • TONSILLECTOMY         Medications  No current facility-administered medications on file prior to encounter.      Current Outpatient Prescriptions on File Prior to Encounter   Medication Sig Dispense  "Refill   • ezetimibe (ZETIA) 10 MG Tab Take 1 Tab by mouth every day. 90 Tab 3   • rivaroxaban (XARELTO) 20 MG Tab tablet Take 1 Tab by mouth with dinner. 90 Tab 1   • metoprolol (LOPRESSOR) 25 MG Tab TAKE 1 TABLET BY MOUTH TWICE DAILY 180 Tab 2   • flecainide (TAMBOCOR) 50 MG tablet Take 1 Tab by mouth 2 times a day. May take additional dose of 50mg PO as needed for Atrial Fibrillation/Palpitations with total of 100mg PO BID. 180 Tab 3   • Probiotic Product (PROBIOTIC DAILY PO) Take 1 Tab by mouth every morning.     • methenamine hip (HIPPREX) 1 GM TABS Take 1 g by mouth every day.     • Omega-3 Fatty Acids (FISH OIL) 1200 MG CAPS Take 1,000 mg by mouth every day.     • conjugated estrogen (PREMARIN) 0.625 MG/GM CREA Insert 0.5 g in vagina See Admin Instructions. Monday PM  Friday AM     • Multiple Vitamin (MULTIVITAMIN PO) Take 1 Tab by mouth every day.     • Glucos-MSM-C-Ce-Xcsyhm-Ymgiit (GLUCOSAMINE MSM COMPLEX) TABS Take 2 Tabs by mouth every day. 1200 mg         Family History  History reviewed. No pertinent family history.    Social History  Social History   Substance Use Topics   • Smoking status: Never Smoker   • Smokeless tobacco: Never Used   • Alcohol use No       Allergies  Allergies   Allergen Reactions   • Levaquin Hives and Swelling     Hives, swelling in throat   • Macrobid [Nitrofurantoin Macrocrystal] Hives and Swelling     Hives, swelling in throat   • Multaq [Dronedarone] Hives and Swelling     Hives, swelling in throat   • Sotalol      \"dizzy\", feels off balance        Physical Exam  Laboratory   Hemodynamics  Temp (24hrs), Av.8 °C (98.2 °F), Min:36.6 °C (97.8 °F), Max:36.9 °C (98.5 °F)   Temperature: 36.6 °C (97.8 °F)  Pulse  Av.2  Min: 60  Max: 61 Heart Rate (Monitored): 60  Blood Pressure : 153/85, NIBP: 138/91      Respiratory      Respiration: 16, Pulse Oximetry: 96 %             Physical Exam   Constitutional: She is oriented to person, place, and time. She appears well-developed " and well-nourished. No distress.   HENT:   Right Ear: External ear normal.   Left Ear: External ear normal.   Nose: Nose normal.   Eyes: Conjunctivae are normal. Right eye exhibits no discharge. Left eye exhibits no discharge.   Neck: No JVD present.   Cardiovascular: Regular rhythm and normal heart sounds.    No murmur heard.  Cap refill 2sec  Pulses 2+ throughout  Normal skin  Color.    Pulmonary/Chest: Effort normal and breath sounds normal. No stridor. No respiratory distress. She has no wheezes. She has no rales.   Abdominal: Soft. Bowel sounds are normal. She exhibits no distension. There is no tenderness.   Musculoskeletal: She exhibits no edema or tenderness.   Neurological: She is alert and oriented to person, place, and time.   Skin: Skin is warm and dry. She is not diaphoretic. No erythema.   Psychiatric: She has a normal mood and affect. Her behavior is normal.   Nursing note and vitals reviewed.      Recent Labs      05/07/18   0912   WBC  4.8   RBC  4.67   HEMOGLOBIN  15.3   HEMATOCRIT  43.3   MCV  92.7   MCH  32.8   MCHC  35.3*   RDW  44.3   PLATELETCT  203   MPV  9.1     Recent Labs      05/07/18   0912   SODIUM  140   POTASSIUM  3.8   CHLORIDE  108   CO2  24   GLUCOSE  102*   BUN  17   CREATININE  0.77   CALCIUM  9.6     Recent Labs      05/07/18   0912   ALTSGPT  18   ASTSGOT  19   ALKPHOSPHAT  76   TBILIRUBIN  0.9   LIPASE  38   GLUCOSE  102*     Recent Labs      05/07/18   0912   APTT  33.2   INR  1.19*     Recent Labs      05/07/18   0912   BNPBTYPENAT  87         Lab Results   Component Value Date    TROPONINI <0.02 05/07/2018     Urinalysis:    Lab Results  Component Value Date/Time   SPECGRAVITY 1.001 02/08/2014 0935   GLUCOSEUR Negative 02/08/2014 0935   KETONES Negative 02/08/2014 0935   NITRITE Negative 02/08/2014 0935   WBCURINE 5-10 (A) 02/08/2014 0935   RBCURINE 0-2 02/08/2014 0935   BACTERIA Few (A) 02/08/2014 0935        Imaging  ekg- nsr wiht incomplete rbbb.   cxr clear    "  Assessment/Plan     I anticipate this patient is appropriate for observation status at this time.    Dizziness- (present on admission)   Assessment & Plan    On occasion an dhas had \"vestibular issues in the past. Given her workup for chest pain is normal and that she does get symptoms of dizziness with her  \"spells\", a  meclizine trial is not unreasonable. She may benefit from pt again. Given the chronicity of her symptoms, i am not concerned about a cva.         Chest pain, atypical   Assessment & Plan    Unclear etiology. Admit, tele monitor, serial trops and stress test.             VTE prophylaxis: none.       "

## 2018-05-07 NOTE — ASSESSMENT & PLAN NOTE
In nsr. Not recurrent. She is having a spell at the time of her ekg thus unlikely recurrent afib.   Anticoagulated and on flecainide.

## 2018-05-07 NOTE — ED NOTES
"Chief Complaint   Patient presents with   • Shortness of Breath     c/o dyspnea on exertion     /93   Pulse 60   Temp 36.9 °C (98.5 °F)   Resp 18   Ht 1.676 m (5' 6\")   Wt 85.8 kg (189 lb 2.5 oz)   LMP  (LMP Unknown)   SpO2 92%   BMI 30.53 kg/m²     "

## 2018-05-07 NOTE — DISCHARGE SUMMARY
CHIEF COMPLAINT ON ADMISSION  Chief Complaint   Patient presents with   • Shortness of Breath     c/o dyspnea on exertion       CODE STATUS  Full Code    HPI & HOSPITAL COURSE  This is a 72 y.o. female here with chest pressure. She has been having syptoms like this for years but they have worsened recently. She was admitted and her workup including tele, serial troponins and a stress test were all negative. There is a suggestion that her symptoms may be vertigo and she has a history of this. She will try as neede meclizine and follow up with her PCP.         Therefore, she is discharged in good and stable condition with close outpatient follow-up.    SPECIFIC OUTPATIENT FOLLOW-UP  pcp    DISCHARGE PROBLEM LIST  Active Problems:    Dizziness (Chronic) POA: Yes    Chest pain, atypical POA: Unknown  Resolved Problems:    Paroxysmal atrial fibrillation (HCC) (Chronic) POA: Yes      FOLLOW UP  No future appointments.  Vu Vila M.D.  [address] [facility] [address]  [phone]    In 2 weeks  As needed      MEDICATIONS ON DISCHARGE   Jayna Lewis   [facility] Medication Instructions CHRISTAL:[id number]    Printed on:05/07/18 4072   Medication Information                      Ascorbic Acid (VITAMIN C) 1000 MG Tab  Take 1 Tab by mouth every morning.             Calcium Carbonate-Vitamin D (CALCIUM 600 + D) 600-400 MG-UNIT Tab  Take 1 Tab by mouth every morning.             conjugated estrogen (PREMARIN) 0.625 MG/GM CREA  Insert 0.5 g in vagina See Admin Instructions. Monday PM  Friday AM             cyanocobalamin (VITAMIN B-12) 100 MCG Tab  Take 100 mcg by mouth every day.             ezetimibe (ZETIA) 10 MG Tab  Take 1 Tab by mouth every day.             flecainide (TAMBOCOR) 50 MG tablet  Take 1 Tab by mouth 2 times a day. May take additional dose of 50mg PO as needed for Atrial Fibrillation/Palpitations with total of 100mg PO BID.             Glucos-MSM-C-Cc-Bcxdem-Gtpspp (GLUCOSAMINE MSM COMPLEX) TABS  Take 2  Tabs by mouth every day. 1200 mg             meclizine (ANTIVERT) 12.5 MG Tab  Take 1 Tab by mouth 3 times a day as needed.             methenamine hip (HIPPREX) 1 GM TABS  Take 1 g by mouth every day.             metoprolol (LOPRESSOR) 25 MG Tab  TAKE 1 TABLET BY MOUTH TWICE DAILY             Multiple Vitamin (MULTIVITAMIN PO)  Take 1 Tab by mouth every day.             Omega-3 Fatty Acids (FISH OIL) 1200 MG CAPS  Take 1,000 mg by mouth every day.             Probiotic Product (PROBIOTIC DAILY PO)  Take 1 Tab by mouth every morning.             rivaroxaban (XARELTO) 20 MG Tab tablet  Take 1 Tab by mouth with dinner.                 DIET  Orders Placed This Encounter   Procedures   • Protocol 1313 Diet Order: Reg, No Decaf, No Caffeine - Cardiac Stress Test Pharmacological     Standing Status:   Standing     Number of Occurrences:   1     Order Specific Question:   Diet:     Answer:   Regular [1]     Order Specific Question:   Miscellaneous modifications:     Answer:   No Decaf, No Caffeine(for test) [11]     Comments:   Protocol 1313 Patient to have no caffeine for 12 hours prior to exam (decaf, coffee, cola, tea, chocolate)       ACTIVITY  As tolerated.  Weight bearing as tolerated      CONSULTATIONS  none    PROCEDURES   Myocardial Perfusion   Report   NUCLEAR IMAGING INTERPRETATION   Normal left ventricular perfusion with no fixed or reversible defects.    Normal left ventricular size, ejection fraction, and wall motion.    ECG INTERPRETATION   Negative stress ECG for ischemia.        LABORATORY  Lab Results   Component Value Date/Time    SODIUM 140 05/07/2018 09:12 AM    POTASSIUM 3.8 05/07/2018 09:12 AM    CHLORIDE 108 05/07/2018 09:12 AM    CO2 24 05/07/2018 09:12 AM    GLUCOSE 102 (H) 05/07/2018 09:12 AM    BUN 17 05/07/2018 09:12 AM    CREATININE 0.77 05/07/2018 09:12 AM        Lab Results   Component Value Date/Time    WBC 4.8 05/07/2018 09:12 AM    HEMOGLOBIN 15.3 05/07/2018 09:12 AM    HEMATOCRIT 43.3  05/07/2018 09:12 AM    PLATELETCT 203 05/07/2018 09:12 AM        Total time of the discharge process exceeds 38 minutes

## 2018-05-07 NOTE — PROGRESS NOTES
Patient educated re: Pharmacological NM MPI. Nursing goals identified: knowledge deficit, potential for anxiety r/t stress test, potential for compromised cardiac output. Care plan includes educating patient, reassurance and access to ACLS cart/team. Labs and ECG reviewed. Pt denies CP. No caffeine and NPO confirmed. After resting images attained, patient prepped for pharmacological stress. Lexiscan given while patient ambulated on TM. Patient reported these symptoms: SOB, lightheadedness, nausea, tension in arms, urgency. Beverage provided. Symptoms resolved. Patient tolerated procedure well.

## 2018-05-07 NOTE — ED NOTES
Med Rec complete per PT and RX list (returned) at bedside   Allergies Reviewed  No ABX in the last 30 days

## 2018-05-07 NOTE — ED NOTES
Pt resting in [facility] reviewing medications with pharm tech showing no signs of distress. VS stable, will continue to monitor.

## 2018-05-07 NOTE — ASSESSMENT & PLAN NOTE
"On occasion an dhas had \"vestibular issues in the past. Given her workup for chest pain is normal and that she does get symptoms of dizziness with her  \"spells\", a  meclizine trial is not unreasonable. She may benefit from pt again. Given the chronicity of her symptoms, i am not concerned about a cva.   "

## 2018-05-07 NOTE — ED PROVIDER NOTES
ED Provider Note    CHIEF COMPLAINT  Chief Complaint   Patient presents with   • Shortness of Breath     c/o dyspnea on exertion       HPI  Jayna Lewis is a 72 y.o. female who presents with chest pain. Started about 2 weeks ago. Gradually worsening. Primarily when she exerts herself or walks. Associated shortness of breath and nausea. This morning she woke up with the symptoms which was different and therefore she came in. She no longer has any chest pain. No pleuritic pain, hemoptysis, leg swelling. She is anticoagulated with previous history of PE as well as atrial fibrillation and ablation. No tearing pain or radiation of the back. Pain is a tight character mild to moderate severity    REVIEW OF SYSTEMS  As per HPI, otherwise a 10 point review of systems is negative    PAST MEDICAL HISTORY  Past Medical History:   Diagnosis Date   • Aneurysm of aortic root (HCC) 2011   • Atrial flutter (HCC) 2011   • Benign essential hypertension 2011   • Cancer ([facility]) 1985    skin- basal cell   • Carotid artery stenosis 2011   • CATARACT     saadia IOL   • Dizziness 2011   • Encounter for long-term (current) use of other medications 2011   • History of depression 2011   • History of echocardiogram 2011   • History of myocardial perfusion scan 2011   • HLD (hyperlipidemia) 2011   • Infectious disease     pt acquired infection post op and states she almost , pt unsure of what the infection was   • Long term (current) use of anticoagulants 2011   • Other specified disorder of intestines     constipation; IBS   • Pacemaker    • Paroxysmal atrial fibrillation (HCC) 2011   • Presence of permanent cardiac pacemaker 2011   • Psychiatric problem     depression   • Pulmonary embolism (HCC) 2011   • Sleep apnea 2011    pt no longer uses cpap, pt was told by md that she no longer had sleep apnea s/p weight loss   • Snoring     sleep study done   • SSS (sick  sinus syndrome) (HCC) 7/22/2011   • Stroke ([facility]) 2009    TIA- suspected   • TIA (transient ischemic attack) 9/27/2011   • Unspecified urinary incontinence        SOCIAL HISTORY  Social History   Substance Use Topics   • Smoking status: Never Smoker   • Smokeless tobacco: Never Used   • Alcohol use No       SURGICAL HISTORY  Past Surgical History:   Procedure Laterality Date   • RECTOCELE REPAIR  2/4/2014    Performed by Tanvir Eden M.D. at [facility]   • BLADDER SLING FEMALE  2/4/2014    Performed by Tanvir Eden M.D. at [facility]   • CYSTOSCOPY  2/4/2014    Performed by Tanvri Eden M.D. at [facility]   • OTHER  2011    pulmonary embolism   • OTHER CARDIAC SURGERY  2010    CATHETER ABLATION - ATRIAL FLUTTER.   • PACEMAKER INSERTION  2009   • HYSTERECTOMY, TOTAL ABDOMINAL     • OTHER      BLADDER SURGERY.   • TONSILLECTOMY         CURRENT MEDICATIONS  Home Medications     Reviewed by Erik Burgos (Pharmacy Tech) on 05/07/18 at 1019  Med List Status: Complete   Medication Last Dose Status   Ascorbic Acid (VITAMIN C) 1000 MG Tab 5/7/2018 Active   Calcium Carbonate-Vitamin D (CALCIUM 600 + D) 600-400 MG-UNIT Tab 5/7/2018 Active   conjugated estrogen (PREMARIN) 0.625 MG/GM CREA 5/4/2018 Active   cyanocobalamin (VITAMIN B-12) 100 MCG Tab 5/7/2018 Active   ezetimibe (ZETIA) 10 MG Tab 5/7/2018 Active   flecainide (TAMBOCOR) 50 MG tablet 5/7/2018 Active   Glucos-MSM-C-Ku-Nnauvv-Vrbtcp (GLUCOSAMINE MSM COMPLEX) TABS 5/7/2018 Active   methenamine hip (HIPPREX) 1 GM TABS 5/7/2018 Active   metoprolol (LOPRESSOR) 25 MG Tab 5/7/2018 Active   Multiple Vitamin (MULTIVITAMIN PO) 5/7/2018 Active   Omega-3 Fatty Acids (FISH OIL) 1200 MG CAPS 5/7/2018 Active   Probiotic Product (PROBIOTIC DAILY PO) 5/7/2018 Active   rivaroxaban (XARELTO) 20 MG Tab tablet 5/6/2018 Active                ALLERGIES  Allergies   Allergen Reactions   • Levaquin Hives and  "Swelling     Hives, swelling in throat   • Macrobid [Nitrofurantoin Macrocrystal] Hives and Swelling     Hives, swelling in throat   • Multaq [Dronedarone] Hives and Swelling     Hives, swelling in throat   • Sotalol      \"dizzy\", feels off balance       PHYSICAL EXAM  VITAL SIGNS: /93   Pulse 60   Temp 36.9 °C (98.5 °F)   Resp 17   Ht 1.676 m (5' 6\")   Wt 85.8 kg (189 lb 2.5 oz)   LMP  (LMP Unknown)   SpO2 91%   BMI 30.53 kg/m²    Constitutional: Awake and alert  HENT:  Atraumatic, Normocephalic.Oropharynx dry mucus membranes, Nose normal inspection.   Eyes: Normal inspection  Neck: Supple  Cardiovascular: Normal heart rate, Normal rhythm.  Symmetric peripheral pulses.   Thorax & Lungs: No respiratory distress, No wheezing, No rales, No rhonchi, No chest tenderness.   Abdomen: Bowel sounds normal, soft, non-distended, nontender, no mass  Skin: Warm, Dry, No rash.   Back: No tenderness, No CVA tenderness.   Extremities: No clubbing, cyanosis, edema, no Homans or cords   Neurologic: Grossly normal   Psychiatric: Anxious appearing    RADIOLOGY/PROCEDURES  DX-CHEST-PORTABLE (1 VIEW)   Final Result         1. No acute cardiopulmonary abnormalities are identified.      NM-CARDIAC STRESS TEST    (Results Pending)     Imaging is interpreted by radiologist    Labs:  Results for orders placed or performed during the hospital encounter of 05/07/18   CBC with Differential   Result Value Ref Range    WBC 4.8 4.8 - 10.8 K/uL    RBC 4.67 4.20 - 5.40 M/uL    Hemoglobin 15.3 12.0 - 16.0 g/dL    Hematocrit 43.3 37.0 - 47.0 %    MCV 92.7 81.4 - 97.8 fL    MCH 32.8 27.0 - 33.0 pg    MCHC 35.3 (H) 33.6 - 35.0 g/dL    RDW 44.3 35.9 - 50.0 fL    Platelet Count 203 164 - 446 K/uL    MPV 9.1 9.0 - 12.9 fL    Neutrophils-Polys 42.30 (L) 44.00 - 72.00 %    Lymphocytes 40.10 22.00 - 41.00 %    Monocytes 11.20 0.00 - 13.40 %    Eosinophils 5.60 0.00 - 6.90 %    Basophils 0.60 0.00 - 1.80 %    Immature Granulocytes 0.20 0.00 - " 0.90 %    Nucleated RBC 0.00 /100 WBC    Neutrophils (Absolute) 2.05 2.00 - 7.15 K/uL    Lymphs (Absolute) 1.94 1.00 - 4.80 K/uL    Monos (Absolute) 0.54 0.00 - 0.85 K/uL    Eos (Absolute) 0.27 0.00 - 0.51 K/uL    Baso (Absolute) 0.03 0.00 - 0.12 K/uL    Immature Granulocytes (abs) 0.01 0.00 - 0.11 K/uL    NRBC (Absolute) 0.00 K/uL   Complete Metabolic Panel (CMP)   Result Value Ref Range    Sodium 140 135 - 145 mmol/L    Potassium 3.8 3.6 - 5.5 mmol/L    Chloride 108 96 - 112 mmol/L    Co2 24 20 - 33 mmol/L    Anion Gap 8.0 0.0 - 11.9    Glucose 102 (H) 65 - 99 mg/dL    Bun 17 8 - 22 mg/dL    Creatinine 0.77 0.50 - 1.40 mg/dL    Calcium 9.6 8.4 - 10.2 mg/dL    AST(SGOT) 19 12 - 45 U/L    ALT(SGPT) 18 2 - 50 U/L    Alkaline Phosphatase 76 30 - 99 U/L    Total Bilirubin 0.9 0.1 - 1.5 mg/dL    Albumin 3.5 3.2 - 4.9 g/dL    Total Protein 6.5 6.0 - 8.2 g/dL    Globulin 3.0 1.9 - 3.5 g/dL    A-G Ratio 1.2 g/dL   Btype Natriuretic Peptide (BNP)   Result Value Ref Range    B Natriuretic Peptide 87 0 - 100 pg/mL   Prothrombin Time (PT/INR)   Result Value Ref Range    PT 15.0 (H) 12.0 - 14.6 sec    INR 1.19 (H) 0.87 - 1.13   APTT   Result Value Ref Range    APTT 33.2 24.7 - 36.0 sec   Troponin STAT   Result Value Ref Range    Troponin I <0.02 0.00 - 0.04 ng/mL   ESTIMATED GFR   Result Value Ref Range    GFR If African American >60 >60 mL/min/1.73 m 2    GFR If Non African American >60 >60 mL/min/1.73 m 2   EKG (ER)   Result Value Ref Range    Report       [facility] Emergency Dept.    Test Date:  2018  Pt Name:    BERONICA CAMILO                 Department: [facility]  MRN:        [id number]                      Room:       -OFF THE [facility]  Gender:     Female                       Technician: MORGAN  :        1945                   Requested By:LONG NORRIS  Order #:    [id number]                    Reading MD: LONG NORRIS MD    Measurements  Intervals                                 Axis  Rate:       60                           P:  NH:         210                          QRS:        -50  QRSD:       109                          T:          14  QT:         418  QTc:        418    Interpretive Statements  Atrial-paced complexes  Left anterior fascicular block  Abnormal R-wave progression, late transition  Probable left ventricular hypertrophy  Abnormal T, consider ischemia, anterior leads  Compared to ECG 09/27/2017 15:17:28      Electronically Signed On 5-7-2018 10:59:51 PDT by LONG NORRIS MD               COURSE & MEDICAL DECISION MAKING  Patient presents with exertional chest pain. She does not have any symptoms while at rest. She is given one aspirin orally while in the ER. EKG shows abnormal T-wave which were present on prior EKG. No suggestion of PE or dissection. Vitals are stable. He'll be admitted for further workup. Dr. Pompa consulted.     FINAL IMPRESSION  1. Chest pain      This dictation was created using voice recognition software. The accuracy of the dictation is limited to the abilities of the software.  The nursing notes were reviewed and certain aspects of this information were incorporated into this note.      Electronically signed by: Long Norris, 5/7/2018 10:58 AM

## 2018-05-08 ENCOUNTER — PATIENT OUTREACH (OUTPATIENT)
Dept: HEALTH INFORMATION MANAGEMENT | Facility: OTHER | Age: 73
End: 2018-05-08

## 2018-05-08 NOTE — PROGRESS NOTES
1110 Pt arrived to room 301-2 via gurney, ambulated to bed, gait steady.    1130 Denies any CP or SOB at this time. Monitored heart rhythm is A -paced on demand (underlying rhythm is SR) w HR 59-60's (P/ 0.08/ 0.38). Pt is informed about stress test at 2pm today & 2nd troponin @1310, has been NPO since 6am.    1400 Transferred to NM.    1350 Back from NM.    1635 Dr. Mancuso is at bedside.    1750 Discharge instruction given to pt, verbalized understanding. IV removed & tip intact. Cardiac monitor returned. Pt discharged to home with personal belongings to private car.

## 2018-05-08 NOTE — DISCHARGE INSTRUCTIONS
Discharge Instructions    Discharged to home by car with relative. Discharged via wheelchair, hospital escort: Yes.  Special equipment needed: Not Applicable    Be sure to schedule a follow-up appointment with your primary care doctor or any specialists as instructed.     Discharge Plan:        I understand that a diet low in cholesterol, fat, and sodium is recommended for good health. Unless I have been given specific instructions below for another diet, I accept this instruction as my diet prescription.   Other diet: a low fat and sodium    Special Instructions:   Dizziness  Dizziness is a common problem. It is a feeling of unsteadiness or light-headedness. You may feel like you are about to faint. Dizziness can lead to injury if you stumble or fall. Anyone can become dizzy, but dizziness is more common in older adults. This condition can be caused by a number of things, including medicines, dehydration, or illness.  Follow these instructions at home:  Taking these steps may help with your condition:  Eating and drinking  · Drink enough fluid to keep your urine clear or pale yellow. This helps to keep you from becoming dehydrated. Try to drink more clear fluids, such as water.  · Do not drink alcohol.  · Limit your caffeine intake if directed by your health care provider.  · Limit your salt intake if directed by your health care provider.  Activity  · Avoid making quick movements.  ¨ Rise slowly from chairs and steady yourself until you feel okay.  ¨ In the morning, first sit up on the side of the bed. When you feel okay, stand slowly while you hold onto something until you know that your balance is fine.  · Move your legs often if you need to  one place for a long time. Tighten and relax your muscles in your legs while you are standing.  · Do not drive or operate heavy machinery if you feel dizzy.  · Avoid bending down if you feel dizzy. Place items in your home so that they are easy for you to reach  without leaning over.  Lifestyle  · Do not use any tobacco products, including cigarettes, chewing tobacco, or electronic cigarettes. If you need help quitting, ask your health care provider.  · Try to reduce your stress level, such as with yoga or meditation. Talk with your health care provider if you need help.  General instructions  · Watch your dizziness for any changes.  · Take medicines only as directed by your health care provider. Talk with your health care provider if you think that your dizziness is caused by a medicine that you are taking.  · Tell a friend or a family member that you are feeling dizzy. If he or she notices any changes in your behavior, have this person call your health care provider.  · Keep all follow-up visits as directed by your health care provider. This is important.  Contact a health care provider if:  · Your dizziness does not go away.  · Your dizziness or light-headedness gets worse.  · You feel nauseous.  · You have reduced hearing.  · You have new symptoms.  · You are unsteady on your feet or you feel like the room is spinning.  Get help right away if:  · You vomit or have diarrhea and are unable to eat or drink anything.  · You have problems talking, walking, swallowing, or using your arms, hands, or legs.  · You feel generally weak.  · You are not thinking clearly or you have trouble forming sentences. It may take a friend or family member to notice this.  · You have chest pain, abdominal pain, shortness of breath, or sweating.  · Your vision changes.  · You notice any bleeding.  · You have a headache.  · You have neck pain or a stiff neck.  · You have a fever.  This information is not intended to replace advice given to you by your health care provider. Make sure you discuss any questions you have with your health care provider.  Document Released: 06/13/2002 Document Revised: 05/25/2017 Document Reviewed: 12/14/2015  [employer] Interactive Patient Education © 2017 [employer]  [employer]      · Is patient discharged on Warfarin / Coumadin?   No     Depression / Suicide Risk    As you are discharged from this [facility] facility, it is important to learn how to keep safe from harming yourself.    Recognize the warning signs:  · Abrupt changes in personality, positive or negative- including increase in energy   · Giving away possessions  · Change in eating patterns- significant weight changes-  positive or negative  · Change in sleeping patterns- unable to sleep or sleeping all the time   · Unwillingness or inability to communicate  · Depression  · Unusual sadness, discouragement and loneliness  · Talk of wanting to die  · Neglect of personal appearance   · Rebelliousness- reckless behavior  · Withdrawal from people/activities they love  · Confusion- inability to concentrate     If you or a loved one observes any of these behaviors or has concerns about self-harm, here's what you can do:  · Talk about it- your feelings and reasons for harming yourself  · Remove any means that you might use to hurt yourself (examples: pills, rope, extension cords, firearm)  · Get professional help from the community (Mental Health, Substance Abuse, psychological counseling)  · Do not be alone:Call your Safe Contact- someone whom you trust who will be there for you.  · Call your local CRISIS HOTLINE 209-7563 or 125-795-7569  · Call your local Children's Mobile Crisis Response Team [place] (165) 470-0625 or www.[employer]  · Call the toll free National Suicide Prevention Hotlines   · National Suicide Prevention Lifeline 222-487-OZED (6225)  · National Hope Line Network 800-SUICIDE (952-1703)

## 2018-05-10 ENCOUNTER — TELEPHONE (OUTPATIENT)
Dept: CARDIOLOGY | Facility: MEDICAL CENTER | Age: 73
End: 2018-05-10

## 2018-05-11 NOTE — TELEPHONE ENCOUNTER
having side effects from her Flecainide   Received: Today   Message Contents   JOSE ENRIQUE Morales/Alfredo       Patient believes she is having side effects from her Flecainide and wants to know how to wean herself off the medication. She can be reached at [phone]      Pt c/o blurred vision, dizziness, fatigue, and trouble walking d/t balance problems. She recently went to ER for chest pressure in addition to these symptoms and work-up was completely negative. Pt saw her PCP who thought she was having BPPV but pt is doubtful as she does not have spinning and neither meclazine nor maneuvers have alleviated symptoms.     Pt would like to know if she can do a trial run off of flecainide and see if her a-fib stays in control and her symptoms subside.     To Dr. Wilhelm: okay to stop flecainide?

## 2018-05-16 ENCOUNTER — PHYSICAL THERAPY (OUTPATIENT)
Dept: PHYSICAL THERAPY | Facility: REHABILITATION | Age: 73
End: 2018-05-16
Attending: FAMILY MEDICINE
Payer: MEDICARE

## 2018-05-16 DIAGNOSIS — R27.0 ATAXIA: ICD-10-CM

## 2018-05-16 PROCEDURE — 97163 PT EVAL HIGH COMPLEX 45 MIN: CPT

## 2018-05-16 PROCEDURE — G8978 MOBILITY CURRENT STATUS: HCPCS | Mod: CL

## 2018-05-16 PROCEDURE — G8979 MOBILITY GOAL STATUS: HCPCS | Mod: CJ

## 2018-05-16 PROCEDURE — 97112 NEUROMUSCULAR REEDUCATION: CPT

## 2018-05-16 ASSESSMENT — ENCOUNTER SYMPTOMS
PAIN SCALE AT LOWEST: 0
PAIN SCALE: 4
PAIN SCALE AT HIGHEST: 10

## 2018-05-16 NOTE — OP THERAPY EVALUATION
Outpatient Physical Therapy  INITIAL EVALUATION    [facility] [address]  [address]  [place] [address]  Phone:  [phone]  Fax:  [phone]    Date of Evaluation: 2018    Patient: Jayna Lewis  YOB: 1945  MRN: [id number]     Referring Provider: No referring provider defined for this encounter.   Referring Diagnosis Ataxia, unspecified [R27.0]     Time Calculation             Physical Therapy Occurrence Codes    Date physical therapy care plan established or reviewed:  18   Date physical therapy treatment started:  18          Chief Complaint: No chief complaint on file.    {No diagnosis found. (Refresh or delete this SmartLink)}      Subjective    Past Medical History:   Diagnosis Date   • Aneurysm of aortic root ([facility]) 2011   • Atrial flutter ([facility]) 2011   • Benign essential hypertension 2011   • Cancer ([facility]) 1985    skin- basal cell   • Carotid artery stenosis 2011   • CATARACT     saadia IOL   • Dizziness 2011   • Encounter for long-term (current) use of other medications 2011   • History of depression 2011   • History of echocardiogram 2011   • History of myocardial perfusion scan 2011   • HLD (hyperlipidemia) 2011   • Infectious disease     pt acquired infection post op and states she almost , pt unsure of what the infection was   • Long term (current) use of anticoagulants 2011   • Other specified disorder of intestines     constipation; IBS   • Pacemaker    • Paroxysmal atrial fibrillation ([facility]) 2011   • Presence of permanent cardiac pacemaker 2011   • Psychiatric problem     depression   • Pulmonary embolism ([facility]) 2011   • Sleep apnea 2011    pt no longer uses cpap, pt was told by md that she no longer had sleep apnea s/p weight loss   • Snoring     sleep study done   • SSS (sick sinus syndrome) ([facility]) 2011   • Stroke ([facility])     TIA- suspected   • TIA  (transient ischemic attack) 9/27/2011   • Unspecified urinary incontinence      Past Surgical History:   Procedure Laterality Date   • RECTOCELE REPAIR  2/4/2014    Performed by Tanvir Eden M.D. [facility]   • BLADDER SLING FEMALE  2/4/2014    [facility]   • CYSTOSCOPY  2/4/2014    Performed by Tanvir Eden M.D. at [facility]   • OTHER  2011    pulmonary embolism   • OTHER CARDIAC SURGERY  2010    CATHETER ABLATION - ATRIAL FLUTTER.   • PACEMAKER INSERTION  2009   • HYSTERECTOMY, TOTAL ABDOMINAL     • OTHER      BLADDER SURGERY.   • TONSILLECTOMY       Social History   Substance Use Topics   • Smoking status: Never Smoker   • Smokeless tobacco: Never Used   • Alcohol use No     Family and Occupational History     Social History   • Marital status:      Spouse name: N/A   • Number of children: N/A   • Years of education: N/A       Objective    Exercises/Treatment  Time-based treatments/modalities:          Assessment/Response/Plan    Functional Limitation G-Codes and Severity Modifiers      Current:     Goal:       Referring provider co-signature:  I have reviewed this plan of care and my co-signature certifies the need for services.  Certification Dates:   From ***     To ***    Physician Signature: ________________________________ Date: ______________

## 2018-05-16 NOTE — OP THERAPY EVALUATION
"  Outpatient Physical Therapy  VESTIBULAR EVALUATION    [address]  [address]  [facility] [address]  Phone:  [phone]  Fax:  [phone]    Date of Evaluation: 2018    Patient: Jayna Lewis  YOB: 1945  MRN: [id number]     Referring Provider: Vu Vila M.D.  [address] [place] [address]   Referring Diagnosis: Ataxia, unspecified [R27.0]     Time Calculation  Start time: 1030  Stop time: 1130 Time Calculation (min): 60 minutes         Chief Complaint: Dizziness    Visit Diagnoses     ICD-10-CM   1. Ataxia R27.0         History of Present Illness:     Mechanism of injury:  73 y/o female states she has had chronic dizziness on and off for many years but had increased sensation of \"fuzzyness\" beginning 2017 . She notes that once she decreased her dose of Flecainide, her dizziness improved by 50%  Symptoms:     Current symptom ratin    At best symptom ratin    At worst symptom rating:  10    Symptom comments:  Has difficulty describing exact sx but denies any true vertigo. Sometimes sx occur with head mvt. Fatigue,and stress which she ataes she has a lot of. Also sometimes has perception issues. She went to ER last week for high BP and difficulty breathing. She also reports frequent episodes of SOB. DHI score 70/100  Diagnostic Tests:     Diagnostic tests comments:  Had work up at ER which was (-)  Treatments:     Treatment comments:  Had vestibular therapy in  which she states helped  Activities of daily living:     Patient reported ADL status:  Walks for exercise. Feels occasional imbalance but no reported falls  Patient goals:     Other patient goals:  Get balance back      Past Medical History:   Diagnosis Date   • Aneurysm of aortic root (HCC) 2011   • Atrial flutter (HCC) 2011   • Benign essential hypertension 2011   • Cancer (HCC) 1985    skin- basal cell   • Carotid artery stenosis 2011   • " CATARACT     saadia IOL   • Dizziness 2011   • Encounter for long-term (current) use of other medications 2011   • History of depression 2011   • History of echocardiogram 2011   • History of myocardial perfusion scan 2011   • HLD (hyperlipidemia) 2011   • Infectious disease     pt acquired infection post op and states she almost , pt unsure of what the infection was   • Long term (current) use of anticoagulants 2011   • Other specified disorder of intestines     constipation; IBS   • Pacemaker    • Paroxysmal atrial fibrillation (HCC) 2011   • Presence of permanent cardiac pacemaker 2011   • Psychiatric problem     depression   • Pulmonary embolism (Former[facility]) 2011   • Sleep apnea 2011    pt no longer uses cpap, pt was told by md that she no longer had sleep apnea s/p weight loss   • Snoring     sleep study done   • SSS (sick sinus syndrome) (Formerly [facility]) 2011   • Stroke (Formerly [facility])     TIA- suspected   • TIA (transient ischemic attack) 2011   • Unspecified urinary incontinence      Past Surgical History:   Procedure Laterality Date   • RECTOCELE REPAIR  2014    Performed by Tanvir Eden M.D. at SURGERY SAME DAY [facility]   • BLADDER SLING FEMALE  2014    Performed by Tanvir Eden M.D. at SURGERY SAME DAY [facility]   • CYSTOSCOPY  2014    Performed by Tanvir Eden M.D. at SURGERY SAME DAY [facility]   • OTHER      pulmonary embolism   • OTHER CARDIAC SURGERY      CATHETER ABLATION - ATRIAL FLUTTER.   • PACEMAKER INSERTION     • HYSTERECTOMY, TOTAL ABDOMINAL     • OTHER      BLADDER SURGERY.   • TONSILLECTOMY       Social History   Substance Use Topics   • Smoking status: Never Smoker   • Smokeless tobacco: Never Used   • Alcohol use No     Family and Occupational History     Social History   • Marital status:      Spouse name: N/A   • Number of children: N/A   • Years of education: N/A       Objective:    Vital  "Signs:     Comments: SaO2 96% HR 61, /90 resting, 125/90 sit to stand  Vestibulospinal Exam:     Sharpened Romberg:         Eyes open (sec): 30        Eyes closed (sec): 8      Dynamic Gait Index: 23/24    Fall Risk: no  Oculomotor Exam:         Details: No spontaneous nystagmus central gaze          Details: No spontaneous nystagmus eccentric gaze    No saccadic eye movements    Convergence:        Normal convergence 5 cm  Hearing Exam:     Comments: States ears feel full, tinnitis, but denies hearing loss  Vestibulo-Ocular Exam:   Normal head thrust test    Comments: Slightly nauseous vorx1 after 10\"  BPPV Exam:     Normal Luann-Hallpike    Normal straight head-hanging test    Normal roll test  Cervicogenic Dizziness Exam:     Normal neck torsion test        Therapeutic Exercises (CPT 12321):     1. Vorx1    2. Partial tandem in corner  E/C      Time-based treatments/modalities:            Assessment:     Assessment details:  Nor sure source of dizziness ( stress,meds?) It does not appear to be vestibular at this time but she states she responded to VRT a few years ago so it may help this time  Goals:     Short term goals:  1)Pt will be able to perform vorx1 for 2' with out nauseousness 2) improve DHI score by 10 points 3) Pt will be able to walk with less feeling of imbalance 4) independent HEP     Short term goal time span:  2-4 weeks    Long term goals:  See above  Plan:     Planned therapy interventions:  Therapeutic Exercise (CPT 17017) and Neuromuscular Re-education (CPT 00344)    Other planned therapy interventions:  VRT concentrating on HEP    Frequency:  1x week    Duration in weeks:  3     Plan details:  1-3 visits      Functional Limitation G-Codes and Severity Modifiers  PT Functional Assessment Tool Used: DHI  PT Functional Assessment Score: 70   Current: Mobility: Current (): CL   Goal: Mobility: Goal (): CJ       Referring provider co-signature:  I have reviewed this plan of care and my " co-signature certifies the need for services.  Certification Dates:   From 5/16/18   To 6/18/18    Physician Signature: ________________________________ Date: ______________

## 2018-05-23 ENCOUNTER — PHYSICAL THERAPY (OUTPATIENT)
Dept: PHYSICAL THERAPY | Facility: REHABILITATION | Age: 73
End: 2018-05-23
Attending: FAMILY MEDICINE
Payer: MEDICARE

## 2018-05-23 DIAGNOSIS — R27.0 ATAXIA: ICD-10-CM

## 2018-05-23 PROCEDURE — 97112 NEUROMUSCULAR REEDUCATION: CPT

## 2018-05-23 NOTE — OP THERAPY DISCHARGE SUMMARY
Outpatient Physical Therapy  DISCHARGE SUMMARY NOTE      [facility] Physical Therapy [address]  [address] [place] [address] [place] [address]  Phone:  [phone]  Fax:  [phone]    Date of Visit: 05/23/2018    Patient: Jayna Lewis  YOB: 1945  MRN: [id number]     Referring Provider: Vu Vila M.D.  [address] [place] [address]   Referring Diagnosis Ataxia, unspecified [R27.0]         Functional Limitation G-Codes and Severity Modifiers      Goal:  [id number]   Discharge:  [id number]       Your patient is being discharged from Physical Therapy with the following comments:   · Pt will do HEP and wait for ENT consult next month    Comments:  See daily note this date    Limitations Remaining:  See note    Recommendations:  D/C per Pt request and resume at later time    Lonnie Bravo, PT    Date: 5/23/2018

## 2018-05-23 NOTE — OP THERAPY DAILY TREATMENT
"  Outpatient Physical Therapy  DAILY TREATMENT     [facility] Physical Therapy [address].  [address]  [place] [address]  Phone:  [phone]  Fax:  [phone]    Date: 05/23/2018    Patient: Jayna Lewis  YOB: 1945  MRN: [id number]     Time Calculation  Start time: 1130  Stop time: 1200 Time Calculation (min): 30 minutes     Chief Complaint: Dizziness    Visit #: 2    SUBJECTIVE:  States she plans to see ENT within the month. States got nauseous driving over here. It feels like she has the stomach flu  But she knows she doesn't    OBJECTIVE:  Current objective measures: MTSIB ( a computerized static balance test) was overall WNL.She got nauseous performing  Smooth pursuit after ~ 10\"          Therapeutic Exercises (CPT 24405):     1. MCTSIB test, 7'    2. X-1 walking    3. VORc walking    4. Tramp bouncing, this actually decreased sx      Time-based treatments/modalities:  Neuromusc re-ed, balance, coor, post minutes (CPT 00492): 30 minutes           ASSESSMENT:   Response to treatment: Pt was mildly nauseous with tx. She would like to hold off PT until ENT next month    PLAN/RECOMMENDATIONS:   Plan for treatment: refer patient back to MD.  Planned interventions for next visit: D/C with HEP      "

## 2018-11-23 DIAGNOSIS — I10 ESSENTIAL HYPERTENSION: ICD-10-CM

## 2018-12-14 ENCOUNTER — NON-PROVIDER VISIT (OUTPATIENT)
Dept: CARDIOLOGY | Facility: MEDICAL CENTER | Age: 73
End: 2018-12-14
Payer: MEDICARE

## 2018-12-14 DIAGNOSIS — Z95.0 PRESENCE OF PERMANENT CARDIAC PACEMAKER: Chronic | ICD-10-CM

## 2018-12-14 PROCEDURE — 93280 PM DEVICE PROGR EVAL DUAL: CPT | Performed by: INTERNAL MEDICINE

## 2019-01-02 ENCOUNTER — OFFICE VISIT (OUTPATIENT)
Dept: CARDIOLOGY | Facility: MEDICAL CENTER | Age: 74
End: 2019-01-02
Payer: MEDICARE

## 2019-01-02 VITALS
BODY MASS INDEX: 31.53 KG/M2 | HEIGHT: 66 IN | HEART RATE: 64 BPM | SYSTOLIC BLOOD PRESSURE: 124 MMHG | WEIGHT: 196.21 LBS | OXYGEN SATURATION: 94 % | DIASTOLIC BLOOD PRESSURE: 80 MMHG

## 2019-01-02 DIAGNOSIS — G45.9 TIA (TRANSIENT ISCHEMIC ATTACK): Chronic | ICD-10-CM

## 2019-01-02 DIAGNOSIS — I65.23 BILATERAL CAROTID ARTERY STENOSIS: Chronic | ICD-10-CM

## 2019-01-02 DIAGNOSIS — Z95.0 PRESENCE OF PERMANENT CARDIAC PACEMAKER: Chronic | ICD-10-CM

## 2019-01-02 DIAGNOSIS — E78.5 DYSLIPIDEMIA: ICD-10-CM

## 2019-01-02 DIAGNOSIS — Z79.01 LONG TERM CURRENT USE OF ANTICOAGULANT THERAPY: Chronic | ICD-10-CM

## 2019-01-02 DIAGNOSIS — I48.92 ATRIAL FLUTTER, UNSPECIFIED TYPE (HCC): Chronic | ICD-10-CM

## 2019-01-02 DIAGNOSIS — I71.21 ANEURYSM OF AORTIC ROOT (HCC): Chronic | ICD-10-CM

## 2019-01-02 DIAGNOSIS — I48.91 ATRIAL FIBRILLATION, UNSPECIFIED TYPE (HCC): ICD-10-CM

## 2019-01-02 DIAGNOSIS — I10 BENIGN ESSENTIAL HYPERTENSION: Chronic | ICD-10-CM

## 2019-01-02 PROCEDURE — 99214 OFFICE O/P EST MOD 30 MIN: CPT | Performed by: INTERNAL MEDICINE

## 2019-01-02 RX ORDER — THIAMINE HCL 100 MG
500 TABLET ORAL DAILY
COMMUNITY

## 2019-01-02 ASSESSMENT — ENCOUNTER SYMPTOMS
CLAUDICATION: 0
BLURRED VISION: 0
VOMITING: 0
CARDIOVASCULAR NEGATIVE: 1
DEPRESSION: 0
PALPITATIONS: 0
PSYCHIATRIC NEGATIVE: 1
NERVOUS/ANXIOUS: 0
FOCAL WEAKNESS: 0
NEUROLOGICAL NEGATIVE: 1
DIZZINESS: 0
HEADACHES: 0
WEIGHT LOSS: 0
CHILLS: 0
BRUISES/BLEEDS EASILY: 0
GASTROINTESTINAL NEGATIVE: 1
RESPIRATORY NEGATIVE: 1
EYES NEGATIVE: 1
ABDOMINAL PAIN: 0
CONSTITUTIONAL NEGATIVE: 1
MUSCULOSKELETAL NEGATIVE: 1
MYALGIAS: 0
DOUBLE VISION: 0
COUGH: 0
FEVER: 0
SHORTNESS OF BREATH: 0
NAUSEA: 0
WEAKNESS: 0

## 2019-01-02 NOTE — PROGRESS NOTES
Chief Complaint   Patient presents with   • Atrial Fibrillation     Follow up       Subjective:   Jayna Lewis is a 73 y.o. female who presents today for annual follow up of ascending aortic aneurysm and study result review.    Since the patient's last visit on 18, she has been doing well clinically. She denies chest pain, shortness of breath, palpitations, nausea/vomiting or diaphoresis. She is moving back to [place].    Past Medical History:   Diagnosis Date   • Aneurysm of aortic root ([facility]) 2011   • Atrial flutter ([facility]) 2011   • Benign essential hypertension 2011   • Cancer ([facility]ly [facility]) 1985    skin- basal cell   • Carotid artery stenosis 2011   • CATARACT     saadia IOL   • Dizziness 2011   • Encounter for long-term (current) use of other medications 2011   • History of depression 2011   • History of echocardiogram 2011   • History of myocardial perfusion scan 2011   • HLD (hyperlipidemia) 2011   • Infectious disease     pt acquired infection post op and states she almost , pt unsure of what the infection was   • Long term (current) use of anticoagulants 2011   • Other specified disorder of intestines     constipation; IBS   • Pacemaker    • Paroxysmal atrial fibrillation ([facility]) 2011   • Presence of permanent cardiac pacemaker 2011   • Psychiatric problem     depression   • Pulmonary embolism ([facility]) 2011   • Sleep apnea 2011    pt no longer uses cpap, pt was told by md that she no longer had sleep apnea s/p weight loss   • Snoring     sleep study done   • SSS (sick sinus syndrome) ([facility]) 2011   • Stroke ([facility])     TIA- suspected   • TIA (transient ischemic attack) 2011   • Unspecified urinary incontinence      Past Surgical History:   Procedure Laterality Date   • RECTOCELE REPAIR  2014    Performed by Tanvir Eden M.D. at SURGERY SAME DAY [facility]   • BLADDER SLING FEMALE  2014    Performed by Tanvir SAWYER  "KUSHAL Eden at SURGERY SAME DAY [facility]   • CYSTOSCOPY  2/4/2014    Performed by Tanvir Eden M.D. at SURGERY SAME DAY [facility]   • OTHER  2011    pulmonary embolism   • OTHER CARDIAC SURGERY  2010    CATHETER ABLATION - ATRIAL FLUTTER.   • PACEMAKER INSERTION  2009   • HYSTERECTOMY, TOTAL ABDOMINAL     • OTHER      BLADDER SURGERY.   • TONSILLECTOMY       Family History   Problem Relation Age of Onset   • Heart Disease Mother    • Heart Disease Father      Social History     Social History   • Marital status:      Spouse name: N/A   • Number of children: N/A   • Years of education: N/A     Occupational History   • Not on file.     Social History Main Topics   • Smoking status: Never Smoker   • Smokeless tobacco: Never Used   • Alcohol use No   • Drug use: No   • Sexual activity: Not on file     Other Topics Concern   • Not on file     Social History Narrative   • No narrative on file     Allergies   Allergen Reactions   • Levaquin Hives and Swelling     Hives, swelling in throat   • Macrobid [Nitrofurantoin Macrocrystal] Hives and Swelling     Hives, swelling in throat   • Multaq [Dronedarone] Hives and Swelling     Hives, swelling in throat   • Sotalol      \"dizzy\", feels off balance     Medications reviewed.    Outpatient Encounter Prescriptions as of 1/2/2019   Medication Sig Dispense Refill   • Magnesium 500 MG Tab Take 500 mg by mouth every day.     • metoprolol (LOPRESSOR) 25 MG Tab TAKE 1 TABLET BY MOUTH TWICE DAILY 180 Tab 0   • Calcium Carbonate-Vitamin D (CALCIUM 600 + D) 600-400 MG-UNIT Tab Take 1 Tab by mouth every morning.     • Ascorbic Acid (VITAMIN C) 1000 MG Tab Take 1 Tab by mouth every morning.     • cyanocobalamin (VITAMIN B-12) 100 MCG Tab Take 100 mcg by mouth every day.     • ezetimibe (ZETIA) 10 MG Tab Take 1 Tab by mouth every day. 90 Tab 3   • rivaroxaban (XARELTO) 20 MG Tab tablet Take 1 Tab by mouth with dinner. 90 Tab 1   • Probiotic Product (PROBIOTIC DAILY PO) " Take 1 Tab by mouth every morning.     • methenamine hip (HIPPREX) 1 GM TABS Take 1 g by mouth every day.     • Omega-3 Fatty Acids (FISH OIL) 1200 MG CAPS Take 1,000 mg by mouth every day.     • conjugated estrogen (PREMARIN) 0.625 MG/GM CREA Insert 0.5 g in vagina See Admin Instructions. Monday PM  Friday AM     • Multiple Vitamin (MULTIVITAMIN PO) Take 1 Tab by mouth every day.     • Glucos-MSM-C-Ax-Zfpwgd-Mvfjlv (GLUCOSAMINE MSM COMPLEX) TABS Take 2 Tabs by mouth every day. 1200 mg     • meclizine (ANTIVERT) 12.5 MG Tab Take 1 Tab by mouth 3 times a day as needed. (Patient not taking: Reported on 1/2/2019) 30 Tab 0   • flecainide (TAMBOCOR) 50 MG tablet Take 1 Tab by mouth 2 times a day. May take additional dose of 50mg PO as needed for Atrial Fibrillation/Palpitations with total of 100mg PO BID. (Patient not taking: Reported on 1/2/2019) 180 Tab 3     No facility-administered encounter medications on file as of 1/2/2019.      Review of Systems   Constitutional: Negative.  Negative for chills, fever, malaise/fatigue and weight loss.   HENT: Negative.  Negative for hearing loss.    Eyes: Negative.  Negative for blurred vision and double vision.   Respiratory: Negative.  Negative for cough and shortness of breath.    Cardiovascular: Negative.  Negative for chest pain, palpitations, claudication and leg swelling.   Gastrointestinal: Negative.  Negative for abdominal pain, nausea and vomiting.   Genitourinary: Negative.  Negative for dysuria and urgency.   Musculoskeletal: Negative.  Negative for joint pain and myalgias.   Skin: Negative.  Negative for itching and rash.   Neurological: Negative.  Negative for dizziness, focal weakness, weakness and headaches.   Endo/Heme/Allergies: Negative.  Does not bruise/bleed easily.   Psychiatric/Behavioral: Negative.  Negative for depression. The patient is not nervous/anxious.         Objective:   /80 (BP Location: Left arm, Patient Position: Sitting, BP Cuff Size:  "Adult)   Pulse 64   Ht 1.676 m (5' 6\")   Wt 89 kg (196 lb 3.4 oz)   LMP  (LMP Unknown)   SpO2 94%   BMI 31.67 kg/m²     Physical Exam   Constitutional: She is oriented to person, place, and time. She appears well-developed and well-nourished.   HENT:   Head: Normocephalic and atraumatic.   Eyes: Pupils are equal, round, and reactive to light. Conjunctivae are normal.   Neck: Normal range of motion. Neck supple.   Cardiovascular: Normal rate and regular rhythm.    Pulmonary/Chest: Effort normal and breath sounds normal.   Abdominal: Soft. Bowel sounds are normal.   Musculoskeletal: Normal range of motion.   Neurological: She is alert and oriented to person, place, and time.   Skin: Skin is warm and dry.   Psychiatric: She has a normal mood and affect.     CARDIAC STUDIES/PROCEDURES:     CAROTID ULTRASOUND (05/14/14)  Very mild plaque seen in carotid artery bilaterally.  Normal vertebral flow.     CAROTID ULTRASOUND (07/14/09)  Carotid ultrasound showing mild stenosis.    CTA OF CHEST (02/09/18)  1.  Minimal ectasia of ascending aorta measuring 4.1 cm in diameter.  2.  No thoracic or abdominal aortic dissection.  3.  No CT evidence for pulmonary embolus.  4.  No pneumonia or pneumothorax.  5.  Cholelithiasis.  (study result reviewed)     CTA OF CHEST (04/15/15)  1. Some fusiform dilatation of the ascending thoracic aorta is noted with maximum axial dimensions of 4.7 x 4.2 cm. There is no evidence of saccular aneurysm and there is no evidence of dissection.  2. No significant atherosclerotic plaque either calcified or noncalcified is visible in the aorta.  3. Main branch vessels of the aorta in the chest and abdomen appear patent.  4. Cholelithiasis.  5. Small area of arterioportal shunting appears to be present laterally in the right lobe of the liver.  6. Linear opacifications in each lung base are likely due to discoid atelectasis or scarring.    ECHOCARDIOGRAM CONCLUSIONS (04/24/18)  Compared to the report of " the study done 05/02/17- there has been no significant change.   Normal left ventricular systolic function.  Left ventricular ejection fraction is visually estimated to be 65%.  Mild mitral regurgitation.  Right ventricular systolic pressure is estimated to be 30 mmHg.  Mild pulmonic insufficiency.  Ascending aorta is dilated with a diameter of 4.2 cm.  (study result reviewed)     ECHOCARDIOGRAM CONCLUSIONS (05/02/17)  Prior echocardiogram 4/25/2016. Compared to the report of the study   done - there has been no significant change.   Normal left ventricular systolic function.  Left ventricular ejection fraction is visually estimated to be 65%.  Grade I diastolic dysfunction.  Pacer/ICD wire seen in right ventricle.  Mild mitral regurgitation.  Mild tricuspid regurgitation.  Estimated right ventricular systolic pressure is 25 mmHg.  Ascending aorta is dilated with a diameter of 4.4 cm.     ECHOCARDIOGRAM CONCLUSIONS (04/25/16)  Compared to the report of the study done 09/14/15- there has been no   significant change.   Normal left ventricular systolic function.  Left ventricular ejection fraction is visually estimated to be 65%.  Grade I diastolic dysfunction.  Pacer/ICD wire seen in right ventricle.  Mild mitral regurgitation.  Mild tricuspid regurgitation.  Right ventricular systolic pressure is estimated to be 25 mmHg.  Mild pulmonic insufficiency.  Ascending aorta is dilated with a diameter of 4.3 cm.     ECHOCARDIOGRAM CONCLUSIONS (09/14/15)  Compared to prior study of 01/07/2015; no significant changes were noted.  Normal left ventricular systolic function.  Left ventricular ejection fraction is visually estimated to be 65%.  Grade I diastolic dysfunction.   Pacer/ICD wire seen in right ventricle.  Mild tricuspid regurgitation.  Right ventricular systolic pressure is estimated to be 30 mmHg.  Mild to moderate pulmonic insufficiency.  Ascending aorta dilatation, 4.3 cm.     ECHOCARDIOGRAM CONCLUSIONS  (01/07/15)  Normal left ventricular systolic function.  Mild concentric left ventricular hypertrophy.  Grade II diastolic dysfunction - pseudonormal mitral inflow is observed.  Pacer wire seen in right ventricle.  Catheter/pacemaker wire present in the right atrial cavity.  Mildly dilated left atrium.  Mitral annular calcification.  Mild mitral regurgitation.  Aortic sclerosis without stenosis.  Mild tricuspid regurgitation.  Trace pulmonic insufficiency.  Aortic root, 3.5 cm. Dilated ascending aorta, 4.5 cm.     ECHOCARDIOGRAM CONCLUSIONS (06/25/13)  Normal left ventricular systolic function.   Left ventricular ejection fraction is 65% to 70%.   Mild mitral regurgitation.  Moderate pulmonic insufficiency.  Ascending aorta measuring 3.5 cm.     ECHOCARDIOGRAM CONCLUSIONS (07/27/11)  Echocardiogram showing ejection fraction of 70% with mild tricuspid regurgitation and mild aortic root dilation.     EKG performed on (05/07/18) was reviewed: EKG shows paced rhythm.  EKG performed on (02/20/14) EKG shows sinus tachycardia with diffuse ST segment depressions.  EKG performed on (02/08/14) EKG shows atrial fibrillation.     Laboratory results of (02/02/18) were reviewed. Cholesterol profile of 183/98/51/112 noted.  Laboratory results of (05/09/16) Cholesterol profile of 150/97/60/71 noted.  Laboratory results of (10/30/15) Cholesterol profile of 195/92/62/115 noted.  Laboratory results of (01/07/15) Cholesterol profile of 182/62/66/104 noted.  Laboratory results of (01/03/12) Protein C and S levels were low.    MYOCARDIAL PERFUSION STUDY CONCLUSIONS (05/07/18)  Normal left ventricular perfusion with no fixed or reversible defects.   Normal left ventricular size, ejection fraction, and wall motion.   ECG INTERPRETATION  Negative stress ECG for ischemia.  (study result reviewed)     MYOCARDIAL PERFUSION STUDY CONCLUSIONS (05/23/17)  No evidence of significant jeopardized viable myocardium or prior myocardial  infarction.  Normal left ventricular wall motion.  LV ejection fraction = 70%.  ECG INTERPRETATION  Negative stress ECG for ischemia.     MPI CONCLUSION (10/06/06)  Unremarkable MPI.    Assessment:     1. Aneurysm of aortic root (CMS-HCC)     2. Atrial fibrillation, unspecified type (HCC)     3. Atrial flutter, unspecified type (HCC)     4. Presence of permanent cardiac pacemaker     5. Long term current use of anticoagulant therapy     6. Benign essential hypertension     7. Dyslipidemia     8. Bilateral carotid artery stenosis     9. TIA (transient ischemic attack)       Medical Decision Making:  Today's Assessment / Status / Plan:     1. Aortic root dilation: Clinically stable on medical therapy and stable.  2. Atrial fibrillation/flutter/sick sinus syndrome with ablation on 09/05/17, pacemaker placement on anticoagulation therapy (Xarelto) and intolerance sotalol (managed by Janes Ron). She is clinically doing well off of flecainide due to dizziness.   3. Hypertension: Blood pressure is well controlled.  4. Hyperlipidemia with intolerance to statin therapy: Her LDL levels increased on [place] 3 FA and Zetia therapy. She will discuss PCSK 9 with her cardiologist in [place].  5. Carotid artery stenosis: Mild disease which is stable on medical therapy.   6. History of trans-ischemic attack: She remains clinically stable without any new neurological symptoms.  7. History of pulmonary embolism: She is clinically doing well without recurrence or chest pain or shortness of breath.  8. Health maintenance: Vaginal hematoma, status post rectocele repair and mid-urethral sling procedure managed by Dr. Eden: Recovered.     We will see the patient as needed.     CC Vu Retana

## 2019-01-14 DIAGNOSIS — Z98.890 S/P ABLATION OF ATRIAL FIBRILLATION: ICD-10-CM

## 2019-01-14 DIAGNOSIS — Z86.79 S/P ABLATION OF ATRIAL FIBRILLATION: ICD-10-CM

## 2019-01-14 DIAGNOSIS — I48.0 PAF (PAROXYSMAL ATRIAL FIBRILLATION) (HCC): ICD-10-CM

## 2019-02-19 DIAGNOSIS — E78.49 OTHER HYPERLIPIDEMIA: ICD-10-CM

## 2019-02-19 DIAGNOSIS — I10 ESSENTIAL HYPERTENSION: ICD-10-CM

## 2019-02-20 RX ORDER — EZETIMIBE 10 MG/1
TABLET ORAL
Qty: 90 TAB | Refills: 3 | Status: SHIPPED | OUTPATIENT
Start: 2019-02-20 | End: 2020-03-05

## 2020-03-05 DIAGNOSIS — I48.0 PAF (PAROXYSMAL ATRIAL FIBRILLATION) (HCC): ICD-10-CM

## 2020-03-05 DIAGNOSIS — E78.49 OTHER HYPERLIPIDEMIA: ICD-10-CM

## 2020-03-05 RX ORDER — EZETIMIBE 10 MG/1
10 TABLET ORAL DAILY
Qty: 30 TAB | Refills: 0 | Status: SHIPPED | OUTPATIENT
Start: 2020-03-05

## 2020-04-08 DIAGNOSIS — I48.0 PAF (PAROXYSMAL ATRIAL FIBRILLATION) (HCC): ICD-10-CM

## 2021-01-15 DIAGNOSIS — Z23 NEED FOR VACCINATION: ICD-10-CM

## 2021-03-05 NOTE — TELEPHONE ENCOUNTER
----- Message from Kyleigh Jovel sent at 9/11/2017  8:34 AM PDT -----  Regarding: AFIB & flutter  Contact: [phone]  MIKE/ana    Pt calling to report she had ablation & KAILEE recently, since last nite pt has experienced AFIB and flutter, PB told pt to call if this were to occur lasting longer than 2 hours.       Please call Jayna at .    
Janes Wilhelm M.D.   You 56 minutes ago (12:55 PM)      She can go up to 100 mg BID of tambocor (Routing comment)        
Pt notified. She does not need RX yet.   
Pt. Went into A Fib/flutter last night and is still in it this am. She takes Pradaxa and Flecainide 50mg BID. Could she take an extra dose of Flecainide?  To Joanna and Dr. Wilhelm. She has [facility] 9-27-17 with Joanna.  
retired